# Patient Record
Sex: FEMALE | Race: WHITE | URBAN - METROPOLITAN AREA
[De-identification: names, ages, dates, MRNs, and addresses within clinical notes are randomized per-mention and may not be internally consistent; named-entity substitution may affect disease eponyms.]

---

## 2017-01-25 PROBLEM — J44.9 COPD (CHRONIC OBSTRUCTIVE PULMONARY DISEASE) (HCC): Status: ACTIVE | Noted: 2017-01-25

## 2017-01-25 RX ORDER — AMLODIPINE BESYLATE 10 MG/1
TABLET ORAL
COMMUNITY
Start: 2007-03-05 | End: 2017-01-27

## 2017-01-25 RX ORDER — BUDESONIDE AND FORMOTEROL FUMARATE DIHYDRATE 160; 4.5 UG/1; UG/1
2 AEROSOL RESPIRATORY (INHALATION) DAILY
COMMUNITY
Start: 2016-08-29 | End: 2017-11-27

## 2017-01-25 RX ORDER — ASPIRIN 81 MG/1
81 TABLET ORAL DAILY
COMMUNITY
Start: 2008-03-05

## 2017-01-25 RX ORDER — BENAZEPRIL HYDROCHLORIDE 40 MG/1
TABLET, FILM COATED ORAL
COMMUNITY
Start: 2007-03-05 | End: 2017-02-01

## 2017-01-25 RX ORDER — NIACIN 250 MG
TABLET, EXTENDED RELEASE ORAL
COMMUNITY
Start: 2013-03-06 | End: 2017-02-01 | Stop reason: SINTOL

## 2017-01-25 RX ORDER — BENAZEPRIL HYDROCHLORIDE 40 MG/1
TABLET, FILM COATED ORAL
Qty: 90 TABLET | Refills: 0 | Status: SHIPPED | OUTPATIENT
Start: 2017-01-25 | End: 2017-05-17

## 2017-01-25 RX ORDER — TRAMADOL HYDROCHLORIDE 50 MG/1
50 TABLET ORAL AS NEEDED
COMMUNITY
Start: 2014-02-10 | End: 2017-05-02

## 2017-01-25 RX ORDER — ALBUTEROL SULFATE 90 UG/1
1 AEROSOL, METERED RESPIRATORY (INHALATION) AS NEEDED
COMMUNITY
Start: 2016-09-13 | End: 2018-01-30

## 2017-01-25 RX ORDER — HYDROCODONE BITARTRATE AND ACETAMINOPHEN 10; 325 MG/1; MG/1
TABLET ORAL
COMMUNITY
Start: 2015-11-23 | End: 2017-02-09

## 2017-01-25 RX ORDER — PYRIDOXINE HCL (VITAMIN B6) 100 MG
TABLET ORAL
COMMUNITY
Start: 2016-12-08 | End: 2017-02-01

## 2017-01-25 RX ORDER — HYDROCHLOROTHIAZIDE 25 MG/1
25 TABLET ORAL DAILY
COMMUNITY
Start: 2007-03-05 | End: 2017-05-17

## 2017-01-27 ENCOUNTER — TELEPHONE (OUTPATIENT)
Dept: FAMILY MEDICINE CLINIC | Facility: CLINIC | Age: 72
End: 2017-01-27

## 2017-01-27 RX ORDER — AMLODIPINE BESYLATE 10 MG/1
10 TABLET ORAL DAILY
Qty: 90 TABLET | Refills: 0 | Status: SHIPPED | OUTPATIENT
Start: 2017-01-27 | End: 2017-05-17

## 2017-01-27 NOTE — TELEPHONE ENCOUNTER
Last Visit 11/22/2016. Has upcoming visit 2/1/17 with Dr Macrina Tamez.   Delisa Cartagena, 01/27/2017, 9:37 AM

## 2017-01-31 ENCOUNTER — MED REC SCAN ONLY (OUTPATIENT)
Dept: FAMILY MEDICINE CLINIC | Facility: CLINIC | Age: 72
End: 2017-01-31

## 2017-02-01 ENCOUNTER — OFFICE VISIT (OUTPATIENT)
Dept: FAMILY MEDICINE CLINIC | Facility: CLINIC | Age: 72
End: 2017-02-01

## 2017-02-01 VITALS
BODY MASS INDEX: 25.03 KG/M2 | HEART RATE: 100 BPM | RESPIRATION RATE: 12 BRPM | TEMPERATURE: 99 F | SYSTOLIC BLOOD PRESSURE: 122 MMHG | HEIGHT: 62 IN | DIASTOLIC BLOOD PRESSURE: 74 MMHG | WEIGHT: 136 LBS

## 2017-02-01 DIAGNOSIS — I10 ESSENTIAL HYPERTENSION: ICD-10-CM

## 2017-02-01 DIAGNOSIS — F17.200 TOBACCO USE DISORDER: ICD-10-CM

## 2017-02-01 DIAGNOSIS — M16.12 PRIMARY OSTEOARTHRITIS OF LEFT HIP: ICD-10-CM

## 2017-02-01 DIAGNOSIS — R11.0 NAUSEA IN ADULT: ICD-10-CM

## 2017-02-01 DIAGNOSIS — I69.959: ICD-10-CM

## 2017-02-01 DIAGNOSIS — S32.9XXS LATE EFFECT OF PELVIC FRACTURE: Primary | ICD-10-CM

## 2017-02-01 PROCEDURE — 99214 OFFICE O/P EST MOD 30 MIN: CPT | Performed by: FAMILY MEDICINE

## 2017-02-01 RX ORDER — ONDANSETRON 4 MG/1
4 TABLET, FILM COATED ORAL EVERY 8 HOURS PRN
Qty: 30 TABLET | Refills: 2 | Status: SHIPPED | OUTPATIENT
Start: 2017-02-01 | End: 2017-06-09

## 2017-02-01 RX ORDER — GABAPENTIN 100 MG/1
100 CAPSULE ORAL NIGHTLY
Qty: 30 CAPSULE | Refills: 5 | Status: SHIPPED | OUTPATIENT
Start: 2017-02-01 | End: 2017-08-29

## 2017-02-01 NOTE — PROGRESS NOTES
[address]  PROGRESS NOTE  Chief Complaint:   Patient presents with: Follow - Up  Nausea: Medication Related? HPI:   This is a 66year old female coming in for follow-up of her pain issues as well as persistent nausea.   She said th tablet (4 mg total) by mouth every 8 (eight) hours as needed for Nausea. Disp: 30 tablet Rfl: 2   AmLODIPine Besylate (NORVASC) 10 MG Oral Tab Take 1 tablet (10 mg total) by mouth daily.  Disp: 90 tablet Rfl: 0   BENAZEPRIL HCL 40 MG Oral Tab TAKE 1 TABLET enlarged nodes or history of splenectomy. PSYCHIATRIC: She is very frustrated by the nausea and said that she has been crying more frequently recently. ENDOCRINOLOGIC:  Denies excessive sweating, cold or heat intolerance, polyuria or polydipsia.   Chloé Parada of left hip  She has significant arthritis in her left hip that continues to cause her pain. 3. Hemiplegia as late effect of cerebrovascular disease ([address]  Her hemiplegia is unchanged.     4. Tobacco use disorder  She continues to smoke 3-6 cigarettes a [facility])     Hemiplegia as late effect of cerebrovascular disease ([facility]     Depression (emotion)     Pain in joint, pelvic region and thigh     Familial multiple lipoprotein-type hyperlipidemia     Essential hypertension     Late effect of pelvic fracture

## 2017-02-01 NOTE — PATIENT INSTRUCTIONS
Take Zofran every 8 hours as needed for nausea. Try Ginger tea - make it with one teaspoon of grated ginger in hot water - take it 2-4 times/day. Take Neurontin 100 mg at bedtime to help with pain.

## 2017-02-09 ENCOUNTER — TELEPHONE (OUTPATIENT)
Dept: FAMILY MEDICINE CLINIC | Facility: CLINIC | Age: 72
End: 2017-02-09

## 2017-02-09 NOTE — TELEPHONE ENCOUNTER
Dr. Venkatesh Hinds can not do her hip replacement. Says is too high risk. Needs to have done at a [facility]. Would like refill on norco and a rx for a laxative.

## 2017-02-10 ENCOUNTER — TELEPHONE (OUTPATIENT)
Dept: FAMILY MEDICINE CLINIC | Facility: CLINIC | Age: 72
End: 2017-02-10

## 2017-02-10 RX ORDER — HYDROCODONE BITARTRATE AND ACETAMINOPHEN 10; 325 MG/1; MG/1
1 TABLET ORAL EVERY 4 HOURS PRN
Qty: 120 TABLET | Refills: 0 | Status: SHIPPED | OUTPATIENT
Start: 2017-02-10 | End: 2017-03-06

## 2017-02-10 RX ORDER — SENNOSIDES 8.6 MG
8.6 TABLET ORAL 2 TIMES DAILY
Qty: 180 TABLET | Refills: 3 | Status: SHIPPED | OUTPATIENT
Start: 2017-02-10 | End: 2020-07-29 | Stop reason: ALTCHOICE

## 2017-02-10 NOTE — TELEPHONE ENCOUNTER
----- Message from Saul Solis sent at 2/10/2017  2:31 PM CST -----  Contact: PATIENT   WANTS TO MAKE SURE RX SHE REQUESTED FOR NORCO AND LAXATIVE IS UP AT THE   BY 4 PM

## 2017-02-10 NOTE — TELEPHONE ENCOUNTER
Patient's phone kept hanging up. No M/L. Copy of Senna instructions left with prescription.   Cheyenne Manning, 02/10/2017, 9:58 AM

## 2017-03-06 ENCOUNTER — TELEPHONE (OUTPATIENT)
Dept: FAMILY MEDICINE CLINIC | Facility: CLINIC | Age: 72
End: 2017-03-06

## 2017-03-06 RX ORDER — HYDROCODONE BITARTRATE AND ACETAMINOPHEN 10; 325 MG/1; MG/1
1 TABLET ORAL EVERY 4 HOURS PRN
Qty: 120 TABLET | Refills: 0 | Status: SHIPPED | OUTPATIENT
Start: 2017-03-06 | End: 2017-04-12

## 2017-03-06 NOTE — TELEPHONE ENCOUNTER
Patient states She is Scheduled for Hip Replacement with   Dr Dileep Gallagher on May 10th. Has PreOp Appt with Dr Nena Siu May 2nd. Please advise Piedad Laguerre.   Karsten Estevez, 03/06/2017, 9:36 AM

## 2017-03-22 ENCOUNTER — MED REC SCAN ONLY (OUTPATIENT)
Dept: FAMILY MEDICINE CLINIC | Facility: CLINIC | Age: 72
End: 2017-03-22

## 2017-03-28 ENCOUNTER — TELEPHONE (OUTPATIENT)
Dept: FAMILY MEDICINE CLINIC | Facility: CLINIC | Age: 72
End: 2017-03-28

## 2017-03-28 NOTE — TELEPHONE ENCOUNTER
Patient states She will be having PreOp Testing Here. States [facility] did not schedule diagnostic testing.   Deborah West, 03/28/2017, 2:45 PM

## 2017-03-28 NOTE — TELEPHONE ENCOUNTER
Patient states She will be having Her PreOp Diagnostic Testing done at [address] [place] [address]  Prior to Her Appt with Dr Cristie Cranker. Fax number given to Patient to have EKG Faxed to Dr Cristie Cranker.   Zainab Hdz, 03/28/2017, 12:44 PM

## 2017-04-03 ENCOUNTER — TELEPHONE (OUTPATIENT)
Dept: FAMILY MEDICINE CLINIC | Facility: CLINIC | Age: 72
End: 2017-04-03

## 2017-04-03 NOTE — TELEPHONE ENCOUNTER
M/L for [facility] [employer] that Patient's PreOp Appt is not until Next Wed 4/12.   Mercy Mohr, 04/03/2017, 3:09 PM

## 2017-04-12 ENCOUNTER — HOSPITAL ENCOUNTER (OUTPATIENT)
Dept: GENERAL RADIOLOGY | Age: 72
Discharge: HOME | End: 2017-04-12
Attending: FAMILY MEDICINE
Payer: MEDICARE

## 2017-04-12 ENCOUNTER — OFFICE VISIT (OUTPATIENT)
Dept: FAMILY MEDICINE CLINIC | Facility: CLINIC | Age: 72
End: 2017-04-12

## 2017-04-12 ENCOUNTER — LAB ENCOUNTER (OUTPATIENT)
Dept: LAB | Age: 72
End: 2017-04-12
Attending: FAMILY MEDICINE
Payer: MEDICARE

## 2017-04-12 VITALS
RESPIRATION RATE: 16 BRPM | HEART RATE: 100 BPM | DIASTOLIC BLOOD PRESSURE: 78 MMHG | OXYGEN SATURATION: 99 % | BODY MASS INDEX: 26.13 KG/M2 | HEIGHT: 62 IN | TEMPERATURE: 99 F | WEIGHT: 142 LBS | SYSTOLIC BLOOD PRESSURE: 152 MMHG

## 2017-04-12 DIAGNOSIS — E78.49 FAMILIAL MULTIPLE LIPOPROTEIN-TYPE HYPERLIPIDEMIA: ICD-10-CM

## 2017-04-12 DIAGNOSIS — I69.959: ICD-10-CM

## 2017-04-12 DIAGNOSIS — J44.9 CHRONIC OBSTRUCTIVE PULMONARY DISEASE, UNSPECIFIED COPD TYPE (HCC): ICD-10-CM

## 2017-04-12 DIAGNOSIS — I10 HTN (HYPERTENSION), BENIGN: ICD-10-CM

## 2017-04-12 DIAGNOSIS — I10 ESSENTIAL HYPERTENSION: ICD-10-CM

## 2017-04-12 DIAGNOSIS — M16.12 PRIMARY OSTEOARTHRITIS OF LEFT HIP: ICD-10-CM

## 2017-04-12 DIAGNOSIS — Z01.818 PREOPERATIVE CLEARANCE: ICD-10-CM

## 2017-04-12 DIAGNOSIS — G89.29 CHRONIC BILATERAL LOW BACK PAIN WITHOUT SCIATICA: ICD-10-CM

## 2017-04-12 DIAGNOSIS — F17.200 TOBACCO USE DISORDER: ICD-10-CM

## 2017-04-12 DIAGNOSIS — Z01.818 PREOPERATIVE CLEARANCE: Primary | ICD-10-CM

## 2017-04-12 DIAGNOSIS — M54.50 CHRONIC BILATERAL LOW BACK PAIN WITHOUT SCIATICA: ICD-10-CM

## 2017-04-12 PROCEDURE — 99214 OFFICE O/P EST MOD 30 MIN: CPT | Performed by: FAMILY MEDICINE

## 2017-04-12 PROCEDURE — 71020 XR CHEST PA + LAT CHEST (CPT=71020): CPT

## 2017-04-12 PROCEDURE — 93000 ELECTROCARDIOGRAM COMPLETE: CPT | Performed by: FAMILY MEDICINE

## 2017-04-12 PROCEDURE — 80053 COMPREHEN METABOLIC PANEL: CPT

## 2017-04-12 PROCEDURE — 85025 COMPLETE CBC W/AUTO DIFF WBC: CPT

## 2017-04-12 RX ORDER — HYDROCODONE BITARTRATE AND ACETAMINOPHEN 10; 325 MG/1; MG/1
1 TABLET ORAL EVERY 4 HOURS PRN
Qty: 120 TABLET | Refills: 0 | Status: SHIPPED | OUTPATIENT
Start: 2017-04-12 | End: 2017-05-12

## 2017-04-12 NOTE — H&P
[facility] [place]  PRE-OP NOTE    HPI:   Jaimee Caldwell is a 66year old female with a hx of osteoarthritis of her hip, who presents for a pre-operative physical exam. Patient is to have left hip replacement, to by done by Dr. Gaudencio Murcia at [facility]. Brittanie Juels daily.   Disp:  Rfl:    TraMADol HCl 50 MG Oral Tab Take 50 mg by mouth as needed. Disp:  Rfl:    Senna 8.6 MG Oral Tab Take 1 tablet (8.6 mg total) by mouth 2 (two) times daily.  Disp: 180 tablet Rfl: 3     Past Medical History   Diagnosis Date   • CVA ( nausea, vomiting, constipation, diarrhea, or blood in stool. MUSCULOSKELETAL: She has excruciating pain in her left hip. She is able to walk only short distances due to pain.   NEUROLOGICAL:  Denies headache, seizures, dizziness, syncope, paralysis, ataxi to her back. EXTREMITIES: She has severe pain in her left hip. She has limited range of motion in the left hip. It is tender to touch the external left hip. NEURO: She is chronic contracture of her right arm due to a previous stroke.   She has mild weak

## 2017-04-14 ENCOUNTER — TELEPHONE (OUTPATIENT)
Dept: FAMILY MEDICINE CLINIC | Facility: CLINIC | Age: 72
End: 2017-04-14

## 2017-04-14 NOTE — TELEPHONE ENCOUNTER
Patient here on 4/12/17 for pre-op physical  Okay to fax notes, EKG, and labs?     Palak Adan, 04/14/2017, 2:25 PM

## 2017-04-17 ENCOUNTER — TELEPHONE (OUTPATIENT)
Dept: FAMILY MEDICINE CLINIC | Facility: CLINIC | Age: 72
End: 2017-04-17

## 2017-04-18 ENCOUNTER — TELEPHONE (OUTPATIENT)
Dept: FAMILY MEDICINE CLINIC | Facility: CLINIC | Age: 72
End: 2017-04-18

## 2017-04-18 NOTE — TELEPHONE ENCOUNTER
----- Message from María Garg MD sent at 4/18/2017 10:33 AM CDT -----  Labs are normal.  Shana is medically clear for surgery.

## 2017-04-19 ENCOUNTER — MED REC SCAN ONLY (OUTPATIENT)
Dept: FAMILY MEDICINE CLINIC | Facility: CLINIC | Age: 72
End: 2017-04-19

## 2017-05-03 RX ORDER — TRAMADOL HYDROCHLORIDE 50 MG/1
TABLET ORAL
Qty: 240 TABLET | Refills: 1 | Status: SHIPPED
Start: 2017-05-03 | End: 2017-07-03

## 2017-05-12 ENCOUNTER — TELEPHONE (OUTPATIENT)
Dept: FAMILY MEDICINE CLINIC | Facility: CLINIC | Age: 72
End: 2017-05-12

## 2017-05-12 RX ORDER — HYDROCODONE BITARTRATE AND ACETAMINOPHEN 10; 325 MG/1; MG/1
1 TABLET ORAL EVERY 4 HOURS PRN
Qty: 120 TABLET | Refills: 0 | Status: SHIPPED | OUTPATIENT
Start: 2017-05-12 | End: 2017-06-08

## 2017-05-12 NOTE — TELEPHONE ENCOUNTER
Patient states Senna has not helped Constipation. Taking Colace also. Please advise new Rx. Also requesting Norco Refill.   Edgar Young, 05/12/2017, 9:16 AM

## 2017-05-17 ENCOUNTER — OFFICE VISIT (OUTPATIENT)
Dept: FAMILY MEDICINE CLINIC | Facility: CLINIC | Age: 72
End: 2017-05-17

## 2017-05-17 VITALS
HEIGHT: 62 IN | RESPIRATION RATE: 16 BRPM | DIASTOLIC BLOOD PRESSURE: 85 MMHG | BODY MASS INDEX: 25.95 KG/M2 | TEMPERATURE: 98 F | SYSTOLIC BLOOD PRESSURE: 142 MMHG | HEART RATE: 86 BPM | WEIGHT: 141 LBS

## 2017-05-17 DIAGNOSIS — M16.12 PRIMARY OSTEOARTHRITIS OF LEFT HIP: ICD-10-CM

## 2017-05-17 DIAGNOSIS — F33.41 RECURRENT MAJOR DEPRESSIVE DISORDER, IN PARTIAL REMISSION (HCC): ICD-10-CM

## 2017-05-17 DIAGNOSIS — I10 ESSENTIAL HYPERTENSION: Primary | ICD-10-CM

## 2017-05-17 DIAGNOSIS — J44.9 CHRONIC OBSTRUCTIVE PULMONARY DISEASE, UNSPECIFIED COPD TYPE (HCC): ICD-10-CM

## 2017-05-17 PROBLEM — Z01.818 PREOPERATIVE EXAMINATION: Status: RESOLVED | Noted: 2017-04-12 | Resolved: 2017-05-17

## 2017-05-17 PROBLEM — R11.0 NAUSEA IN ADULT: Status: RESOLVED | Noted: 2017-02-01 | Resolved: 2017-05-17

## 2017-05-17 PROCEDURE — 99214 OFFICE O/P EST MOD 30 MIN: CPT | Performed by: FAMILY MEDICINE

## 2017-05-17 RX ORDER — HYDROCHLOROTHIAZIDE 25 MG/1
25 TABLET ORAL DAILY
Qty: 90 TABLET | Refills: 3 | Status: SHIPPED | OUTPATIENT
Start: 2017-05-17 | End: 2018-07-10

## 2017-05-17 RX ORDER — BENAZEPRIL HYDROCHLORIDE 40 MG/1
40 TABLET, FILM COATED ORAL
Qty: 90 TABLET | Refills: 3 | Status: SHIPPED | OUTPATIENT
Start: 2017-05-17 | End: 2019-03-19 | Stop reason: ALTCHOICE

## 2017-05-17 RX ORDER — AMLODIPINE BESYLATE 10 MG/1
10 TABLET ORAL DAILY
Qty: 90 TABLET | Refills: 3 | Status: SHIPPED | OUTPATIENT
Start: 2017-05-17 | End: 2018-08-14

## 2017-05-17 NOTE — PROGRESS NOTES
[address]  PROGRESS NOTE  Chief Complaint:   Patient presents with: Follow - Up: hip replacement, renew blood pressure medication      HPI:   This is a 66year old female coming in for follow-up of her left hip replacement.   She said t uL   Lymphocyte Absolute 3.11 0.90-4.00 x10(3) uL   Monocyte Absolute 0.74 (H) 0.10-0.60 x10(3) uL   Eosinophil Absolute 0.24 0.00-0.30 x10(3) uL   Basophil Absolute 0.07 0.00-0.10 x10(3) uL   Immature Granulocyte Absolute 0.04 0.00-1.00 x10(3) uL   Neutro hours as needed for Pain (Limit 4 daily).  Disp: 120 tablet Rfl: 0   TRAMADOL HCL 50 MG Oral Tab TAKE 2 TABLETS BY MOUTH EVERY 4 HOURS AS NEEDED, MAX 8 TABLETS A DAY Disp: 240 tablet Rfl: 1   gabapentin 100 MG Oral Cap Take 1 capsule (100 mg total) by mouth cough or sputum. GASTROINTESTINAL:  Denies abdominal pain, nausea, vomiting, constipation, diarrhea, or blood in stool. MUSCULOSKELETAL: Her left hip arthritis pain is much better.   The discomfort that she has now is postoperative pain and is gradually i rales/rhonchi/wheezing. ABDOMEN:  Soft, nondistended, nontender, bowel sounds normal in all 4 quadrants, no masses, no hepatosplenomegaly. BACK: No tenderness, no spasm, SLR test negative, FROM. EXTREMITIES: Left hip: Surgical wound is healing well.   No pulmonary disease) ([facility]     Hemiplegia as late effect of cerebrovascular disease (HCC)     Depression (emotion)     Pain in joint, pelvic region and thigh     Familial multiple lipoprotein-type hyperlipidemia     Essential hypertension     Late effect of

## 2017-06-08 ENCOUNTER — TELEPHONE (OUTPATIENT)
Dept: FAMILY MEDICINE CLINIC | Facility: CLINIC | Age: 72
End: 2017-06-08

## 2017-06-08 NOTE — TELEPHONE ENCOUNTER
Patient is going to be moving from one daughters house to the next. Patient states that she had a hip replacement and has been taking norco and that really helps with the pain.  Patient states that she has enough for the weekend but would really like a refi

## 2017-06-09 RX ORDER — ONDANSETRON 4 MG/1
4 TABLET, FILM COATED ORAL EVERY 8 HOURS PRN
Qty: 30 TABLET | Refills: 2 | Status: SHIPPED | OUTPATIENT
Start: 2017-06-09 | End: 2019-03-19

## 2017-06-09 RX ORDER — HYDROCODONE BITARTRATE AND ACETAMINOPHEN 10; 325 MG/1; MG/1
1 TABLET ORAL EVERY 4 HOURS PRN
Qty: 120 TABLET | Refills: 0 | Status: SHIPPED | OUTPATIENT
Start: 2017-06-09 | End: 2017-07-03

## 2017-06-09 NOTE — TELEPHONE ENCOUNTER
Patient also requesting refill Zofran. Patient states She has been having increased bouts of crying. Patient is in the process of moving to Her Daughter Jaclyn's house. Requesting to restart Sertraline.      Had problem in past with fatigue on Sertraline

## 2017-06-12 NOTE — PROGRESS NOTES
[facility] [place]  PROGRESS NOTE  Chief Complaint:   Patient presents with:  Depression  Hand Pain: Thumb  Leg Pain      HPI:   This is a 66year old female coming in for multiple concerns today. She has been feeling more depressed recently. Absolute 0.74 (H) 0.10-0.60 x10(3) uL   Eosinophil Absolute 0.24 0.00-0.30 x10(3) uL   Basophil Absolute 0.07 0.00-0.10 x10(3) uL   Immature Granulocyte Absolute 0.04 0.00-1.00 x10(3) uL   Neutrophil % 58.5 %   Lymphocyte % 30.7 %   Monocyte % 7.3 %   Eosi mouth daily. Disp: 90 tablet Rfl: 3   Benazepril HCl 40 MG Oral Tab Take 1 tablet (40 mg total) by mouth once daily. Disp: 90 tablet Rfl: 3   AmLODIPine Besylate (NORVASC) 10 MG Oral Tab Take 1 tablet (10 mg total) by mouth daily.  Disp: 90 tablet Rfl: 3 syncope, paralysis, ataxia, numbness or tingling in the extremities,change in bowel or bladder control. HEMATOLOGIC:  Denies anemia, bleeding or bruising. LYMPHATICS:  Denies enlarged nodes or history of splenectomy.   PSYCHIATRIC: She is feeling more and varicose veins bilaterally. She has barely palpable dorsalis pedal pulses on both feet. Color of both feet is normal.  NEURO:  No deficit, normal gait, strength and tone, sensory intact. ASSESSMENT AND PLAN:   1.  Moderate episode of recurrent major de fracture     Pain in soft tissues of limb     Low back pain     Tobacco use disorder     Osteoarthrosis     Peripheral vascular disease ([address]     Sciatica     Arthropathy of pelvis     Fracture of superior pubic ramus (HCC)     H/O laminectomy     Osteoart

## 2017-06-29 ENCOUNTER — TELEPHONE (OUTPATIENT)
Dept: FAMILY MEDICINE CLINIC | Facility: CLINIC | Age: 72
End: 2017-06-29

## 2017-06-30 NOTE — TELEPHONE ENCOUNTER
As above. Spoke with Jamshid Briscoe. Patient's home phone is not working. Appt moved Monday from the afternoon to Monday Morning 7/3 9:15 with   Dr Vannesa Gee.   Santosh Ray, 06/30/17, 10:41 AM

## 2017-07-03 NOTE — PROGRESS NOTES
[address]  PROGRESS NOTE  Chief Complaint:   Patient presents with: Anxiety: Daughter Recent Dx Cancer. Medication refill      HPI:   This is a 66year old female coming in for check on her Lexapro.   She said that since she started norman Immature Granulocyte Absolute 0.04 0.00 - 1.00 x10(3) uL   Neutrophil % 58.5 %   Lymphocyte % 30.7 %   Monocyte % 7.3 %   Eosinophil % 2.4 %   Basophil % 0.7 %   Immature Granulocyte % 0.4 %       Past Medical History:   Diagnosis Date   • Acute, but ill hydrochlorothiazide 25 MG Oral Tab Take 1 tablet (25 mg total) by mouth daily. Disp: 90 tablet Rfl: 3   Benazepril HCl 40 MG Oral Tab Take 1 tablet (40 mg total) by mouth once daily.  Disp: 90 tablet Rfl: 3   AmLODIPine Besylate (NORVASC) 10 MG Oral Tab T dizziness, syncope, paralysis, ataxia, numbness or tingling in the extremities,change in bowel or bladder control. HEMATOLOGIC:  Denies anemia, bleeding or bruising. LYMPHATICS:  Denies enlarged nodes or history of splenectomy.   PSYCHIATRIC: Her depressi fatigue. Plan: Increase the Lexapro to 10 mg daily. Hopefully that will help with some of the significant fatigue that she has been feeling. 2. Essential hypertension  She has hypertension. Her blood pressure control has been good.     3. Tobacco use use disorder     Osteoarthrosis     Peripheral vascular disease (HCC)     Sciatica     Arthropathy of pelvis     Fracture of superior pubic ramus (HCC)     H/O laminectomy     Osteoarthritis of hip      Db Oglesby MD  7/3/2017  12:59 PM

## 2017-08-01 ENCOUNTER — TELEPHONE (OUTPATIENT)
Dept: FAMILY MEDICINE CLINIC | Facility: CLINIC | Age: 72
End: 2017-08-01

## 2017-08-01 RX ORDER — ONDANSETRON 4 MG/1
TABLET, FILM COATED ORAL
Qty: 30 TABLET | Refills: 1 | Status: SHIPPED | OUTPATIENT
Start: 2017-08-01 | End: 2017-09-05

## 2017-08-01 RX ORDER — HYDROCODONE BITARTRATE AND ACETAMINOPHEN 10; 325 MG/1; MG/1
1 TABLET ORAL EVERY 4 HOURS PRN
Qty: 180 TABLET | Refills: 0 | Status: SHIPPED | OUTPATIENT
Start: 2017-08-01 | End: 2017-08-28

## 2017-08-01 NOTE — TELEPHONE ENCOUNTER
Patient informed Rx ready to . Solitario Mejiaerer, 08/01/17, 12:34 PM    Patient states She has been taking 2-3 naps a day. States with Lexapro 5mg it was not helping with Her Depression. Feels the 10mg is causing Her drowsiness.     Also feels She is

## 2017-08-28 ENCOUNTER — TELEPHONE (OUTPATIENT)
Dept: FAMILY MEDICINE CLINIC | Facility: CLINIC | Age: 72
End: 2017-08-28

## 2017-08-28 RX ORDER — HYDROCODONE BITARTRATE AND ACETAMINOPHEN 10; 325 MG/1; MG/1
1 TABLET ORAL EVERY 4 HOURS PRN
Qty: 180 TABLET | Refills: 0 | Status: SHIPPED | OUTPATIENT
Start: 2017-08-28 | End: 2017-09-25

## 2017-08-28 NOTE — TELEPHONE ENCOUNTER
LOV: 7/3/17  Last Refill:8/1/17  Last Labs:4/12/17    Future Appointments  Date Time Provider Aroldo Lopez   11/17/2017 11:00 AM Rony Ken MD [facility] [place] [facility]

## 2017-08-29 RX ORDER — GABAPENTIN 100 MG/1
CAPSULE ORAL
Qty: 30 CAPSULE | Refills: 11 | Status: SHIPPED | OUTPATIENT
Start: 2017-08-29 | End: 2017-10-06

## 2017-08-29 NOTE — TELEPHONE ENCOUNTER
Last ov: 7/3/17  Last RF: 2/1/17  Last labs: 4/12/17  Your appointments     Date & Time Appointment Department [facility])    Nov 17, 2017 11:00 AM CST Follow up - Extended with Merly Stroud MD [address] [place] [address]

## 2017-09-05 ENCOUNTER — OFFICE VISIT (OUTPATIENT)
Dept: FAMILY MEDICINE CLINIC | Facility: CLINIC | Age: 72
End: 2017-09-05

## 2017-09-05 ENCOUNTER — TELEPHONE (OUTPATIENT)
Dept: FAMILY MEDICINE CLINIC | Facility: CLINIC | Age: 72
End: 2017-09-05

## 2017-09-05 VITALS
RESPIRATION RATE: 20 BRPM | TEMPERATURE: 99 F | SYSTOLIC BLOOD PRESSURE: 108 MMHG | DIASTOLIC BLOOD PRESSURE: 80 MMHG | WEIGHT: 143.81 LBS | HEIGHT: 62 IN | BODY MASS INDEX: 26.46 KG/M2 | HEART RATE: 84 BPM

## 2017-09-05 DIAGNOSIS — H92.21 BLEEDING FROM RIGHT EAR: Primary | ICD-10-CM

## 2017-09-05 PROCEDURE — 99213 OFFICE O/P EST LOW 20 MIN: CPT | Performed by: NURSE PRACTITIONER

## 2017-09-05 RX ORDER — NEOMYCIN SULFATE, POLYMYXIN B SULFATE, HYDROCORTISONE 3.5; 10000; 1 MG/ML; [USP'U]/ML; MG/ML
4 SOLUTION/ DROPS AURICULAR (OTIC) 4 TIMES DAILY
Qty: 10 ML | Refills: 0 | Status: SHIPPED | OUTPATIENT
Start: 2017-09-05 | End: 2017-10-06 | Stop reason: ALTCHOICE

## 2017-09-05 NOTE — PATIENT INSTRUCTIONS
Use drops as directed. Okay to apply cotton to the area for the bleeding. Contact office if bleeding does not stop in the next 24-48 hours. Otherwise follow-up with Dr. Coral Lord as previously directed.

## 2017-09-05 NOTE — TELEPHONE ENCOUNTER
Patient Today woke up with bleeding in right ear. States not having any pain, but bleeding persisting. Has cotton ball in ear. Appt given today 1:30 with Kristel CARTWRIGHT for evalution of Sx.   Guzman Perez, 09/05/17, 9:56 AM

## 2017-09-05 NOTE — PROGRESS NOTES
HPI:    Patient ID: Rubi Sierra is a 66year old female. HPI     Woke up with bleeding to the right ear canal this ear. Is tired after hosting a wedding over the weekend at her house. Left ear has been itching. Tired, but otherwise feeling ok. mg total) by mouth daily. Disp: 90 tablet Rfl: 3   Albuterol Sulfate HFA (PROAIR HFA) 108 (90 BASE) MCG/ACT Inhalation Aero Soln Inhale 1 puff into the lungs as needed. Disp:  Rfl:    aspirin 81 MG Oral Tab EC Take 81 mg by mouth daily.    Disp:  Rfl: kg/m².         ASSESSMENT/PLAN:   Bleeding from right ear  (primary encounter diagnosis)    No orders of the defined types were placed in this encounter.       Meds This Visit:  Signed Prescriptions Disp Refills    Neomycin-Polymyxin-HC (CORTISPORIN) 1 % Ot

## 2017-09-11 RX ORDER — TRAMADOL HYDROCHLORIDE 50 MG/1
TABLET ORAL
Qty: 240 TABLET | Refills: 1 | Status: SHIPPED
Start: 2017-09-11 | End: 2017-11-21

## 2017-09-11 NOTE — TELEPHONE ENCOUNTER
Last office visit 7/3/17  Last BW 4/12/17    F/u 3 months    Future Appointments  Date Time Provider Aroldo Lopez   11/17/2017 11:00 AM Rony Ken MD [facility] [place] [facility]

## 2017-09-13 ENCOUNTER — TELEPHONE (OUTPATIENT)
Dept: FAMILY MEDICINE CLINIC | Facility: CLINIC | Age: 72
End: 2017-09-13

## 2017-09-13 RX ORDER — ESCITALOPRAM OXALATE 5 MG/1
5 TABLET ORAL DAILY
Qty: 90 TABLET | Refills: 3 | Status: SHIPPED | OUTPATIENT
Start: 2017-09-13 | End: 2017-10-06

## 2017-09-13 NOTE — TELEPHONE ENCOUNTER
Patient states Lexapro 10mg is causing to much fatigue. Requesting to change back to 5mg Lexapro. Requesting new Rx to Pharmacy. Please advise.   Delisa Cartagena, 09/13/17, 9:54 AM

## 2017-09-25 ENCOUNTER — TELEPHONE (OUTPATIENT)
Dept: FAMILY MEDICINE CLINIC | Facility: CLINIC | Age: 72
End: 2017-09-25

## 2017-09-25 RX ORDER — HYDROCODONE BITARTRATE AND ACETAMINOPHEN 10; 325 MG/1; MG/1
1 TABLET ORAL EVERY 4 HOURS PRN
Qty: 180 TABLET | Refills: 0 | Status: SHIPPED | OUTPATIENT
Start: 2017-09-25 | End: 2017-10-06

## 2017-09-25 NOTE — TELEPHONE ENCOUNTER
Future appt:     Your appointments     Date & Time Appointment Department [facility])    Nov 17, 2017 11:00 AM CST Follow up - Extended with Trevin Montana MD [address] [place] [address] ([facility])        [address] [place] [address]

## 2017-10-04 ENCOUNTER — TELEPHONE (OUTPATIENT)
Dept: FAMILY MEDICINE CLINIC | Facility: CLINIC | Age: 72
End: 2017-10-04

## 2017-10-04 RX ORDER — CYCLOBENZAPRINE HCL 10 MG
10 TABLET ORAL 3 TIMES DAILY
Qty: 30 TABLET | Refills: 1 | Status: SHIPPED
Start: 2017-10-04 | End: 2017-10-24

## 2017-10-04 NOTE — TELEPHONE ENCOUNTER
Patient is requesting Muscle Relaxant. States Her Left Buttock is very hard and painful. Please advise.   Neel Hernandez, 10/04/17, 10:27 AM

## 2017-10-06 ENCOUNTER — OFFICE VISIT (OUTPATIENT)
Dept: FAMILY MEDICINE CLINIC | Facility: CLINIC | Age: 72
End: 2017-10-06

## 2017-10-06 VITALS
HEIGHT: 62 IN | TEMPERATURE: 99 F | BODY MASS INDEX: 25.98 KG/M2 | SYSTOLIC BLOOD PRESSURE: 132 MMHG | HEART RATE: 108 BPM | DIASTOLIC BLOOD PRESSURE: 64 MMHG | WEIGHT: 141.19 LBS | RESPIRATION RATE: 16 BRPM

## 2017-10-06 DIAGNOSIS — M54.50 CHRONIC BILATERAL LOW BACK PAIN WITHOUT SCIATICA: Primary | ICD-10-CM

## 2017-10-06 DIAGNOSIS — I10 ESSENTIAL HYPERTENSION: ICD-10-CM

## 2017-10-06 DIAGNOSIS — M16.10: ICD-10-CM

## 2017-10-06 DIAGNOSIS — M15.9 PRIMARY OSTEOARTHRITIS INVOLVING MULTIPLE JOINTS: ICD-10-CM

## 2017-10-06 DIAGNOSIS — S32.519S: ICD-10-CM

## 2017-10-06 DIAGNOSIS — G89.29 CHRONIC BILATERAL LOW BACK PAIN WITHOUT SCIATICA: Primary | ICD-10-CM

## 2017-10-06 DIAGNOSIS — F33.1 MODERATE EPISODE OF RECURRENT MAJOR DEPRESSIVE DISORDER (HCC): ICD-10-CM

## 2017-10-06 DIAGNOSIS — I73.9 PERIPHERAL VASCULAR DISEASE (HCC): ICD-10-CM

## 2017-10-06 DIAGNOSIS — J44.9 CHRONIC OBSTRUCTIVE PULMONARY DISEASE, UNSPECIFIED COPD TYPE (HCC): ICD-10-CM

## 2017-10-06 PROCEDURE — 99214 OFFICE O/P EST MOD 30 MIN: CPT | Performed by: FAMILY MEDICINE

## 2017-10-06 RX ORDER — HYDROCODONE BITARTRATE AND ACETAMINOPHEN 10; 325 MG/1; MG/1
TABLET ORAL
Qty: 240 TABLET | Refills: 0 | Status: SHIPPED | OUTPATIENT
Start: 2017-10-06 | End: 2017-11-10

## 2017-10-06 RX ORDER — HYDROCODONE BITARTRATE AND ACETAMINOPHEN 10; 325 MG/1; MG/1
TABLET ORAL
Qty: 240 TABLET | Refills: 0 | Status: SHIPPED | OUTPATIENT
Start: 2017-10-06 | End: 2017-10-06

## 2017-10-06 NOTE — PROGRESS NOTES
[address]  PROGRESS NOTE  Chief Complaint:   Patient presents with: Follow - Up: Medications and Pain/ Right Buttock  Hives: From Gabapentin? HPI:   This is a 66year old female coming in for follow-up on her pain.   She is very [employer] Absolute 3.11 0.90 - 4.00 x10(3) uL   Monocyte Absolute 0.74 (H) 0.10 - 0.60 x10(3) uL   Eosinophil Absolute 0.24 0.00 - 0.30 x10(3) uL   Basophil Absolute 0.07 0.00 - 0.10 x10(3) uL   Immature Granulocyte Absolute 0.04 0.00 - 1.00 x10(3) uL   Neutrophil % Rfl: 1   Ondansetron HCl (ZOFRAN) 4 mg tablet Take 1 tablet (4 mg total) by mouth every 8 (eight) hours as needed for Nausea. Disp: 30 tablet Rfl: 2   hydrochlorothiazide 25 MG Oral Tab Take 1 tablet (25 mg total) by mouth daily.  Disp: 90 tablet Rfl: 3   B Denies anemia, bleeding or bruising. LYMPHATICS:  Denies enlarged nodes or history of splenectomy. PSYCHIATRIC: She denies feeling depressed and yet was very sad.   ENDOCRINOLOGIC:  Denies excessive sweating, cold or heat intolerance, polyuria or polydips perspective the only thing that has been helpful for her as Norco.    2. Closed fracture of superior pubic ramus, unspecified laterality, sequela  She has a healing closed fracture of the superior pubic ramus.     3. Chronic obstructive pulmonary disease, u Depression (emotion)     Pain in joint, pelvic region and thigh     Familial multiple lipoprotein-type hyperlipidemia     Essential hypertension     Late effect of pelvic fracture     Pain in soft tissues of limb     Low back pain     Tobacco use disorder

## 2017-10-06 NOTE — PATIENT INSTRUCTIONS
Stop taking Gabapentin and Lexapro. Norco increased to 8/day. Call with progress on chest rash - should heal over the next 2 weeks.

## 2017-10-16 ENCOUNTER — TELEPHONE (OUTPATIENT)
Dept: FAMILY MEDICINE CLINIC | Facility: CLINIC | Age: 72
End: 2017-10-16

## 2017-10-16 NOTE — TELEPHONE ENCOUNTER
Patient informed Insurance will not cover the Norco increase. Informed Rx for Elizabeth Looney will revert back to original dose/agreed. Christal informed of above also. They will change Rx received to #180 tabs and use original directions.   Jodi Batista, 10/

## 2017-10-30 ENCOUNTER — TELEPHONE (OUTPATIENT)
Dept: FAMILY MEDICINE CLINIC | Facility: CLINIC | Age: 72
End: 2017-10-30

## 2017-10-30 NOTE — TELEPHONE ENCOUNTER
Rash on chest is still there, also on back, and base of neck, used everything.   Please give her a call

## 2017-10-31 ENCOUNTER — OFFICE VISIT (OUTPATIENT)
Dept: FAMILY MEDICINE CLINIC | Facility: CLINIC | Age: 72
End: 2017-10-31

## 2017-10-31 VITALS
HEIGHT: 61.5 IN | DIASTOLIC BLOOD PRESSURE: 76 MMHG | WEIGHT: 140.5 LBS | HEART RATE: 98 BPM | BODY MASS INDEX: 26.19 KG/M2 | SYSTOLIC BLOOD PRESSURE: 128 MMHG | RESPIRATION RATE: 16 BRPM | TEMPERATURE: 99 F

## 2017-10-31 DIAGNOSIS — R21 RASH: Primary | ICD-10-CM

## 2017-10-31 PROCEDURE — 99213 OFFICE O/P EST LOW 20 MIN: CPT | Performed by: NURSE PRACTITIONER

## 2017-10-31 NOTE — PATIENT INSTRUCTIONS
Use steroid cream to area twice a day for 2 weeks. Do not lot this medication get on your face or be covered with vaseline. If no improvement, consider dermatology referral.  Resume gabapentin. Recommend changing detergent.   Monitor for signs of infecti

## 2017-10-31 NOTE — PROGRESS NOTES
[address] [place] [address]  PROGRESS NOTE  Andressa Saucedo is a 67year old female. Chief Complaint:  Patient presents with:  Rash: rash for about 6 weeks, moving to back    HPI:   Patient presents to office visit with complaint of rash x 6 weeks. HIP REPLACEMENT SURGERY Left  No date: HYSTERECTOMY  No date: TUBAL LIGATION  Social History:  Smoking status: Current Every Day Smoker                                                   Packs/day: 0.50      Years: 50.00        Start date: 1/1/1967  Shereen (SYMBICORT) 160-4.5 MCG/ACT Inhalation Aerosol Inhale 2 puffs into the lungs daily. Disp:  Rfl:    Senna 8.6 MG Oral Tab Take 1 tablet (8.6 mg total) by mouth 2 (two) times daily.  Disp: 180 tablet Rfl: 3      Ready to quit: Not Answered  Counseling given benefits, side effects of medication addressed and explained. Patient Instructions   Use steroid cream to area twice a day for 2 weeks. Do not lot this medication get on your face or be covered with vaseline.    If no improvement, consider dermatology re

## 2017-11-09 ENCOUNTER — TELEPHONE (OUTPATIENT)
Dept: FAMILY MEDICINE CLINIC | Facility: CLINIC | Age: 72
End: 2017-11-09

## 2017-11-09 NOTE — TELEPHONE ENCOUNTER
Patient states she has been feeling very well as long as she keeps up with taking the [address].  States she usually takes 2 tabs at 7:00am, 2 tabs around 1:00pm, and when she wakes up in the night with pain, she will take 1-2 tabs.     Patient states the other

## 2017-11-09 NOTE — TELEPHONE ENCOUNTER
OTW for Friday:  needs norco refill- requesting the quantity of 240- said son is going to pay for the script so that she doesn't have a hard time with Medicare

## 2017-11-10 RX ORDER — HYDROCODONE BITARTRATE AND ACETAMINOPHEN 10; 325 MG/1; MG/1
TABLET ORAL
Qty: 180 TABLET | Refills: 0 | Status: SHIPPED | OUTPATIENT
Start: 2017-11-10 | End: 2017-12-06

## 2017-11-10 NOTE — TELEPHONE ENCOUNTER
The goal for the Lachoen Rides is to gradually be decreasing the dose not increasing it. At this point I will not write for an increased dose. I will give her a prescription for 180 tablets. However I will not write a prescription for 240 tablets.

## 2017-11-21 RX ORDER — ONDANSETRON 4 MG/1
TABLET, FILM COATED ORAL
Qty: 30 TABLET | Refills: 0 | Status: SHIPPED | OUTPATIENT
Start: 2017-11-21 | End: 2017-12-26

## 2017-11-21 RX ORDER — TRAMADOL HYDROCHLORIDE 50 MG/1
TABLET ORAL
Qty: 240 TABLET | Refills: 0 | Status: SHIPPED
Start: 2017-11-21 | End: 2017-12-26

## 2017-11-21 RX ORDER — DULOXETIN HYDROCHLORIDE 30 MG/1
CAPSULE, DELAYED RELEASE ORAL
Qty: 30 CAPSULE | Refills: 0 | Status: SHIPPED | OUTPATIENT
Start: 2017-11-21 | End: 2018-07-17 | Stop reason: ALTCHOICE

## 2017-11-21 NOTE — TELEPHONE ENCOUNTER
Future appt:  None   Last Appointment:  10/6/2017; Return in about 3 months (around 1/6/2018).      No results found for: CHOLEST, HDL, LDL, TRIGLY, TRIG  No results found for: EAG, A1C  No results found for: T4F, TSH, TSHT4    Last Labs: 4/12/2017     No F

## 2017-11-27 RX ORDER — BUDESONIDE AND FORMOTEROL FUMARATE DIHYDRATE 160; 4.5 UG/1; UG/1
2 AEROSOL RESPIRATORY (INHALATION) DAILY
Qty: 1 INHALER | Refills: 11 | Status: SHIPPED | OUTPATIENT
Start: 2017-11-27 | End: 2017-11-28

## 2017-11-27 NOTE — TELEPHONE ENCOUNTER
refill needed on her symbicort, got a fill on duloxetine, stated she doesn't take this, diamond leach, please advise

## 2017-11-27 NOTE — TELEPHONE ENCOUNTER
Requesting refill Symbicort to [facility]. States Her legs have been very painful. Questions if there is a problem with Her stents? Advised to call Surgeon who placed the stents and go in for consultation/agreed.   Malvin Garner, 11/27/17, 3:

## 2017-11-28 ENCOUNTER — TELEPHONE (OUTPATIENT)
Dept: FAMILY MEDICINE CLINIC | Facility: CLINIC | Age: 72
End: 2017-11-28

## 2017-11-28 RX ORDER — BUDESONIDE AND FORMOTEROL FUMARATE DIHYDRATE 160; 4.5 UG/1; UG/1
2 AEROSOL RESPIRATORY (INHALATION) 2 TIMES DAILY
Qty: 1 INHALER | Refills: 11 | Status: SHIPPED | OUTPATIENT
Start: 2017-11-28 | End: 2018-12-18

## 2017-12-06 RX ORDER — HYDROCODONE BITARTRATE AND ACETAMINOPHEN 10; 325 MG/1; MG/1
TABLET ORAL
Qty: 180 TABLET | Refills: 0 | Status: SHIPPED | OUTPATIENT
Start: 2017-12-06 | End: 2018-01-08

## 2017-12-14 ENCOUNTER — OFFICE VISIT (OUTPATIENT)
Dept: FAMILY MEDICINE CLINIC | Facility: CLINIC | Age: 72
End: 2017-12-14

## 2017-12-14 VITALS
SYSTOLIC BLOOD PRESSURE: 130 MMHG | RESPIRATION RATE: 18 BRPM | TEMPERATURE: 99 F | WEIGHT: 137.38 LBS | DIASTOLIC BLOOD PRESSURE: 72 MMHG | HEART RATE: 96 BPM | HEIGHT: 61.5 IN | BODY MASS INDEX: 25.6 KG/M2

## 2017-12-14 DIAGNOSIS — L02.91 ABSCESS: Primary | ICD-10-CM

## 2017-12-14 DIAGNOSIS — H53.8 BLURRY VISION, BILATERAL: ICD-10-CM

## 2017-12-14 DIAGNOSIS — R35.0 URINARY FREQUENCY: ICD-10-CM

## 2017-12-14 DIAGNOSIS — R32 URINARY INCONTINENCE, UNSPECIFIED TYPE: ICD-10-CM

## 2017-12-14 PROCEDURE — 87086 URINE CULTURE/COLONY COUNT: CPT | Performed by: NURSE PRACTITIONER

## 2017-12-14 PROCEDURE — 87205 SMEAR GRAM STAIN: CPT | Performed by: NURSE PRACTITIONER

## 2017-12-14 PROCEDURE — 87070 CULTURE OTHR SPECIMN AEROBIC: CPT | Performed by: NURSE PRACTITIONER

## 2017-12-14 PROCEDURE — 81003 URINALYSIS AUTO W/O SCOPE: CPT | Performed by: NURSE PRACTITIONER

## 2017-12-14 PROCEDURE — 87077 CULTURE AEROBIC IDENTIFY: CPT | Performed by: NURSE PRACTITIONER

## 2017-12-14 PROCEDURE — 99214 OFFICE O/P EST MOD 30 MIN: CPT | Performed by: NURSE PRACTITIONER

## 2017-12-14 RX ORDER — SULFAMETHOXAZOLE AND TRIMETHOPRIM 800; 160 MG/1; MG/1
1 TABLET ORAL 2 TIMES DAILY
Qty: 20 TABLET | Refills: 0 | Status: SHIPPED | OUTPATIENT
Start: 2017-12-14 | End: 2018-01-30 | Stop reason: ALTCHOICE

## 2017-12-14 NOTE — PROGRESS NOTES
[address] [place] [address]  PROGRESS NOTE  Estefani Panchal is a 67year old female.     Chief Complaint:  Patient presents with:  Blurred Vision  Lesion: sores on legs  Fatigue: and body aches  Leaking: bladder leaking frequent  Loss Of Appetite: eats 4 date: CHOLECYSTECTOMY  4/17/2017: HIP REPLACEMENT SURGERY Left  No date: HYSTERECTOMY  No date: TUBAL LIGATION  Social History:  Smoking status: Current Every Day Smoker                                                   Packs/day: 0.50      Years: 50.00 40 MG Oral Tab Take 1 tablet (40 mg total) by mouth once daily. Disp: 90 tablet Rfl: 3   AmLODIPine Besylate (NORVASC) 10 MG Oral Tab Take 1 tablet (10 mg total) by mouth daily.  Disp: 90 tablet Rfl: 3   Albuterol Sulfate HFA (PROAIR HFA) 108 (90 BASE) MCG/ EMOI. TMs pearly, no retraction, no bulging, no fluid, landmarks present, posterior pharynx pink, no exudates. Nares patent, nasal mucosa pink and nonedematous.   NECK: supple, no cervical lymphadenopathy  LUNGS: clear to auscultation, no wheezing, rhonchi time  Return in 2 days for follow up. Call if fever, chills, spreading redness, vomiting. Due to physical with PCP, return after the holidays for this. Blurry vision: Trial refresh eyedrops over-the-counter as needed.   Recommend patient calling oph

## 2017-12-14 NOTE — PROCEDURES
I&D performed: Cleaned area with Betadine ×3 then used 1 cc lidocaine with epinephrine to numb area, used #11 scalpel for vertical laceration, able to get some discharge from site and culture collected. Bacitracin applied with Band-Aid.   Patient gave vika

## 2017-12-14 NOTE — PATIENT INSTRUCTIONS
Push fluids, rest.  Antibiotic as prescribed, take with food. Call your opthalmology   Urine culture and wound culture collected, will take 3 days for results  Warm compresses to wound on leg 3x a day for 10 min each time  Return in 2 days for follow up.

## 2017-12-16 ENCOUNTER — TELEPHONE (OUTPATIENT)
Dept: FAMILY MEDICINE CLINIC | Facility: CLINIC | Age: 72
End: 2017-12-16

## 2017-12-16 NOTE — TELEPHONE ENCOUNTER
As above. Patient had to cancel today's appt. R/S to Tuesday 12/19. Wanting Kathy to know Her vision has cleared up 70%. Asking if She is to keep Her leg wound covered or open to air? Please advise.   Mercy Mohr, 12/16/17, 8:55 AM

## 2017-12-16 NOTE — TELEPHONE ENCOUNTER
Noted, is patient feeling better? No fever, chills, vomiting, spreading redness from site? If there is any drainage from site, recommend keeping covered with Band-Aid. Continue with plan of care as discussed in last office visit.   Glad vision has improv

## 2017-12-16 NOTE — TELEPHONE ENCOUNTER
Patient states she if feeling much better. Notified of Kathy's recommendation, expressed understanding and thanks.

## 2017-12-16 NOTE — TELEPHONE ENCOUNTER
Pt had to cancel appointmentt this morning due to daughter being sick and not being able to drive her. Pt states that she would like to be called back.

## 2017-12-18 ENCOUNTER — APPOINTMENT (OUTPATIENT)
Dept: LAB | Age: 72
End: 2017-12-18
Attending: FAMILY MEDICINE
Payer: MEDICARE

## 2017-12-18 ENCOUNTER — OFFICE VISIT (OUTPATIENT)
Dept: FAMILY MEDICINE CLINIC | Facility: CLINIC | Age: 72
End: 2017-12-18

## 2017-12-18 VITALS
BODY MASS INDEX: 26 KG/M2 | TEMPERATURE: 99 F | HEART RATE: 85 BPM | OXYGEN SATURATION: 95 % | SYSTOLIC BLOOD PRESSURE: 132 MMHG | WEIGHT: 139.63 LBS | DIASTOLIC BLOOD PRESSURE: 64 MMHG | RESPIRATION RATE: 18 BRPM

## 2017-12-18 DIAGNOSIS — Z09 FOLLOW UP: ICD-10-CM

## 2017-12-18 DIAGNOSIS — H53.8 BLURRY VISION: Primary | ICD-10-CM

## 2017-12-18 DIAGNOSIS — G47.9 SLEEP TROUBLE: ICD-10-CM

## 2017-12-18 DIAGNOSIS — L02.91 ABSCESS: ICD-10-CM

## 2017-12-18 PROCEDURE — 80053 COMPREHEN METABOLIC PANEL: CPT | Performed by: NURSE PRACTITIONER

## 2017-12-18 PROCEDURE — 85025 COMPLETE CBC W/AUTO DIFF WBC: CPT | Performed by: NURSE PRACTITIONER

## 2017-12-18 PROCEDURE — 36415 COLL VENOUS BLD VENIPUNCTURE: CPT | Performed by: NURSE PRACTITIONER

## 2017-12-18 PROCEDURE — 84443 ASSAY THYROID STIM HORMONE: CPT | Performed by: NURSE PRACTITIONER

## 2017-12-18 PROCEDURE — 99214 OFFICE O/P EST MOD 30 MIN: CPT | Performed by: NURSE PRACTITIONER

## 2017-12-18 NOTE — PROGRESS NOTES
[address]  PROGRESS NOTE  Giovanna Gaston is a 67year old female. Chief Complaint:  Patient presents with:   Other: loss of appetite, fatigue, blurred [id number] got worse recheck leg is feeling better    HPI:   Patient presents CHOLECYSTECTOMY  4/17/2017: HIP REPLACEMENT SURGERY Left  No date: HYSTERECTOMY  No date: TUBAL LIGATION  Social History:  Smoking status: Current Every Day Smoker                                                   Packs/day: 0.50      Years: 50.00        S MG Oral Tab Take 1 tablet (25 mg total) by mouth daily. Disp: 90 tablet Rfl: 3   Benazepril HCl 40 MG Oral Tab Take 1 tablet (40 mg total) by mouth once daily.  Disp: 90 tablet Rfl: 3   AmLODIPine Besylate (NORVASC) 10 MG Oral Tab Take 1 tablet (10 mg total rhonchi, crackles. Breathing is nonlabored.   CARDIO: RRR, S1 and S2 heard, without murmur  GI: good BS's,no masses, no HSM, no tenderness, no G/R  EXTREMITIES: no cyanosis, clubbing or edema  Psych: Alert and orientated ×3, appropriate affect      ASSESSM

## 2017-12-18 NOTE — PATIENT INSTRUCTIONS
Continue antibiotic as prescribed. Abscess site looking good, continue with warm soaks 3 x a day  Call ophthalmology, Dr. Trujillo Marking today. Go to ER if eye pain or vision worsens  Blood work today.   Do physical with PCP in 1-2 weeks or return sooner if sympt

## 2017-12-19 ENCOUNTER — TELEPHONE (OUTPATIENT)
Dept: FAMILY MEDICINE CLINIC | Facility: CLINIC | Age: 72
End: 2017-12-19

## 2017-12-19 NOTE — TELEPHONE ENCOUNTER
----- Message from SHARON Delgadillo sent at 12/19/2017  7:24 AM CST -----  TSH normal  CMP essentially normal  CBC essentially normal  Please call pt to ensure she has called her ophthalmologist in [place], Dr. Lieutenant Kulkarni.  Pt was to have called him for blurry

## 2017-12-19 NOTE — TELEPHONE ENCOUNTER
Patient notified of results and recommendations. States she will call ophthalmologist today today to make appt.

## 2017-12-22 ENCOUNTER — TELEPHONE (OUTPATIENT)
Dept: FAMILY MEDICINE CLINIC | Facility: CLINIC | Age: 72
End: 2017-12-22

## 2017-12-22 DIAGNOSIS — I73.9 PERIPHERAL VASCULAR DISEASE (HCC): Primary | ICD-10-CM

## 2017-12-22 NOTE — TELEPHONE ENCOUNTER
needs IR order consult  / for radiation to [facility]     for leg blood flow issues      okay to address next week  OTW

## 2017-12-22 NOTE — TELEPHONE ENCOUNTER
Patient denies any swelling, redness or edema, no chest pain, or SOB. States if she has any of these symptoms she will go to ER.

## 2017-12-22 NOTE — TELEPHONE ENCOUNTER
Please ensure patient is not having any type of leg edema, redness, pain that could be related to DVT. Ensure patient not having any chest pain, shortness of breath as well. If asymptomatic, okay to wait for PCP for order.

## 2017-12-26 RX ORDER — TRAMADOL HYDROCHLORIDE 50 MG/1
TABLET ORAL
Qty: 240 TABLET | Refills: 1 | Status: SHIPPED
Start: 2017-12-26 | End: 2018-02-20

## 2017-12-26 RX ORDER — ONDANSETRON 4 MG/1
TABLET, FILM COATED ORAL
Qty: 30 TABLET | Refills: 3 | Status: SHIPPED | OUTPATIENT
Start: 2017-12-26 | End: 2018-01-30

## 2017-12-26 NOTE — TELEPHONE ENCOUNTER
Patient in bed with the flu. Daughter informed Dr Zilphia Krabbe advising Referal to Dr Radha Carrillo regarding Leg Pain/agreed. Phone number given-She will call for Appt.   Sharmaine Garcia, 12/26/17, 2:55 PM

## 2017-12-26 NOTE — TELEPHONE ENCOUNTER
Future appt:  None   Last Appointment:  10/6/2017 (Dr. Abhi Moy); Return in about 3 months (around 1/6/2018).      No results found for: CHOLEST, HDL, LDL, TRIGLY, TRIG  No results found for: EAG, A1C    Lab Results  Component Value Date   TSH 1.660 12/18/2

## 2017-12-30 ENCOUNTER — TELEPHONE (OUTPATIENT)
Dept: FAMILY MEDICINE CLINIC | Facility: CLINIC | Age: 72
End: 2017-12-30

## 2017-12-30 NOTE — TELEPHONE ENCOUNTER
Patient states She feels bad. Wants antibiotic. Short of breath/cough. Asked to speak with daughter. Family is in [place]. Spoke with JAY. Patient has been laying in bed x2 days. Walking to bathroom only and this is exhausted.   Parveen Olmstead

## 2018-01-05 ENCOUNTER — TELEPHONE (OUTPATIENT)
Dept: FAMILY MEDICINE CLINIC | Facility: CLINIC | Age: 73
End: 2018-01-05

## 2018-01-05 RX ORDER — ALPRAZOLAM 0.5 MG/1
0.5 TABLET ORAL 3 TIMES DAILY PRN
Qty: 30 TABLET | Refills: 1 | Status: SHIPPED
Start: 2018-01-05 | End: 2018-02-28

## 2018-01-05 NOTE — TELEPHONE ENCOUNTER
Pt was hospitalized for approximately one wk at [facility].  Daughter states pt has COPD, but also dx: with COPD, pt returned home last night,  Pt currently on oxygen.   Daughter states that pt was given Xanax while at [place], and asking for RX at th

## 2018-01-05 NOTE — TELEPHONE ENCOUNTER
was seen in [facility] and was prescibed Xanax    would like RF  to [facility] in [place]     ( has appt on 1/12 )         please advise

## 2018-01-08 ENCOUNTER — TELEPHONE (OUTPATIENT)
Dept: FAMILY MEDICINE CLINIC | Facility: CLINIC | Age: 73
End: 2018-01-08

## 2018-01-08 RX ORDER — HYDROCODONE BITARTRATE AND ACETAMINOPHEN 10; 325 MG/1; MG/1
TABLET ORAL
Qty: 180 TABLET | Refills: 0 | Status: SHIPPED | OUTPATIENT
Start: 2018-01-08 | End: 2018-01-08

## 2018-01-08 RX ORDER — HYDROCODONE BITARTRATE AND ACETAMINOPHEN 10; 325 MG/1; MG/1
TABLET ORAL
Qty: 180 TABLET | Refills: 0 | Status: SHIPPED | OUTPATIENT
Start: 2018-01-08 | End: 2018-06-19 | Stop reason: ALTCHOICE

## 2018-01-08 NOTE — TELEPHONE ENCOUNTER
Future appt:     Your appointments     Date & Time Appointment Department [facility])    Jan 12, 2018 11:30 AM CST Exam - Established Patient with Eros Mclean MD [facility] [place] ([facility]        Ariela

## 2018-01-16 ENCOUNTER — TELEPHONE (OUTPATIENT)
Dept: FAMILY MEDICINE CLINIC | Facility: CLINIC | Age: 73
End: 2018-01-16

## 2018-01-16 NOTE — TELEPHONE ENCOUNTER
I tried to reach [facility] to let her know that she no showed for her MD appointment today. Mailbox/ voicemail not set up to leave a message.

## 2018-01-30 ENCOUNTER — OFFICE VISIT (OUTPATIENT)
Dept: FAMILY MEDICINE CLINIC | Facility: CLINIC | Age: 73
End: 2018-01-30

## 2018-01-30 VITALS
RESPIRATION RATE: 18 BRPM | OXYGEN SATURATION: 97 % | HEIGHT: 61.5 IN | DIASTOLIC BLOOD PRESSURE: 80 MMHG | BODY MASS INDEX: 24.44 KG/M2 | HEART RATE: 102 BPM | TEMPERATURE: 98 F | WEIGHT: 131.13 LBS | SYSTOLIC BLOOD PRESSURE: 140 MMHG

## 2018-01-30 DIAGNOSIS — J11.1 INFLUENZA: Primary | ICD-10-CM

## 2018-01-30 DIAGNOSIS — J44.9 CHRONIC OBSTRUCTIVE PULMONARY DISEASE, UNSPECIFIED COPD TYPE (HCC): ICD-10-CM

## 2018-01-30 DIAGNOSIS — M54.50 CHRONIC BILATERAL LOW BACK PAIN WITHOUT SCIATICA: ICD-10-CM

## 2018-01-30 DIAGNOSIS — G89.29 CHRONIC BILATERAL LOW BACK PAIN WITHOUT SCIATICA: ICD-10-CM

## 2018-01-30 DIAGNOSIS — G89.4 CHRONIC PAIN SYNDROME: ICD-10-CM

## 2018-01-30 PROCEDURE — 99214 OFFICE O/P EST MOD 30 MIN: CPT | Performed by: FAMILY MEDICINE

## 2018-01-30 RX ORDER — PREGABALIN 75 MG/1
75 CAPSULE ORAL DAILY
Qty: 30 CAPSULE | Refills: 5 | Status: SHIPPED | OUTPATIENT
Start: 2018-01-30 | End: 2018-02-03

## 2018-01-30 RX ORDER — HYDROCODONE BITARTRATE AND ACETAMINOPHEN 10; 325 MG/1; MG/1
TABLET ORAL
Qty: 180 TABLET | Refills: 0 | Status: CANCELLED | OUTPATIENT
Start: 2018-01-30

## 2018-01-30 RX ORDER — ALBUTEROL SULFATE 90 UG/1
2 AEROSOL, METERED RESPIRATORY (INHALATION) EVERY 4 HOURS PRN
Qty: 3 INHALER | Refills: 3 | Status: SHIPPED | OUTPATIENT
Start: 2018-01-30 | End: 2019-05-14

## 2018-01-30 NOTE — PROGRESS NOTES
[facility] [place]  PROGRESS NOTE  Chief Complaint:   Patient presents with:  Hospital F/U      HPI:   This is a 67year old female coming in for hospital follow-up.   She was hospitalized at [facility] from January 6 through the 13th due t 6.70 x10 (3) uL   Neutrophil Absolute 5.55 1.30 - 6.70 x10(3) uL   Lymphocyte Absolute 2.85 0.90 - 4.00 x10(3) uL   Monocyte Absolute 0.56 0.10 - 0.60 x10(3) uL   Eosinophil Absolute 0.24 0.00 - 0.30 x10(3) uL   Basophil Absolute 0.04 0.00 - 0.10 x10(3) uL HYDROcodone-acetaminophen (NORCO)  MG Oral Tab Take 1-2 tablets every 4 hours as needed for pain. Limit 6/day. Disp: 180 tablet Rfl: 0   ALPRAZolam 0.5 MG Oral Tab Take 1 tablet (0.5 mg total) by mouth 3 (three) times daily as needed for Sleep.  Aubree Coburn throat. INTEGUMENTARY:  Denies rashes, itching, skin lesion, or excessive skin dryness. CARDIOVASCULAR:  Denies chest pain, chest pressure, chest discomfort, palpitations, edema, dyspnea on exertion or at rest.  RESPIRATORY: She has home oxygen.   She darinel normal. Nose: patent, no nasal discharge Throat:  No tonsillar erythema or exudate. Mouth:  No oral lesions or ulcerations, good dentition. NECK: Supple, no CLAD, no JVD, no thyromegaly. SKIN: No rashes, no skin lesion, no bruising, good turgor.   HEART: Aero Soln 3 Inhaler 3      Sig: Inhale 2 puffs into the lungs every 4 (four) hours as needed. pregabalin (LYRICA) 75 MG Oral Cap 30 capsule 5      Sig: Take 1 capsule (75 mg total) by mouth daily.              Patient/Caregiver Education: Patient/Careg

## 2018-01-31 ENCOUNTER — MED REC SCAN ONLY (OUTPATIENT)
Dept: FAMILY MEDICINE CLINIC | Facility: CLINIC | Age: 73
End: 2018-01-31

## 2018-02-03 ENCOUNTER — TELEPHONE (OUTPATIENT)
Dept: FAMILY MEDICINE CLINIC | Facility: CLINIC | Age: 73
End: 2018-02-03

## 2018-02-03 RX ORDER — PREGABALIN 75 MG/1
75 CAPSULE ORAL 2 TIMES DAILY
Qty: 60 CAPSULE | Refills: 5 | Status: SHIPPED
Start: 2018-02-03 | End: 2018-02-13

## 2018-02-03 NOTE — TELEPHONE ENCOUNTER
Patient informed of below. Expressed understanding. Needing updated Rx to Pharmacy. Please advise.   Jodi Batista, 02/03/18, 10:01 AM

## 2018-02-03 NOTE — TELEPHONE ENCOUNTER
wants to let dr Janae Martines know that Nik Benson is giving her about 4 hours of comfort about an hour after she takes medication- wants to know if he should increase dose?

## 2018-02-13 ENCOUNTER — TELEPHONE (OUTPATIENT)
Dept: FAMILY MEDICINE CLINIC | Facility: CLINIC | Age: 73
End: 2018-02-13

## 2018-02-13 RX ORDER — PREGABALIN 75 MG/1
75 CAPSULE ORAL 3 TIMES DAILY
COMMUNITY
Start: 2018-02-13 | End: 2018-02-20

## 2018-02-16 ENCOUNTER — TELEPHONE (OUTPATIENT)
Dept: FAMILY MEDICINE CLINIC | Facility: CLINIC | Age: 73
End: 2018-02-16

## 2018-02-16 NOTE — TELEPHONE ENCOUNTER
Patient states She is going to cut back to Lyrica 1x daily until She sees Dr Paula Morgan on Tuesday. States since She increased to 3daily She has become Angry/Aggitated/Blurry Vision/Leg Weakness.     Patient will take Xanax 1/2 tab in the morning/afternoon t

## 2018-02-16 NOTE — TELEPHONE ENCOUNTER
Noted, Pt to go to ER if  sob, cp, numbness or tingling of arms, slurred speech, headache, mental or vision changes.

## 2018-02-16 NOTE — TELEPHONE ENCOUNTER
Daughter Luis Angel Speak informed of below. Expressed understanding.   Aimee Lugo, 02/16/18, 1:48 PM

## 2018-02-20 ENCOUNTER — HOSPITAL ENCOUNTER (OUTPATIENT)
Dept: GENERAL RADIOLOGY | Age: 73
Discharge: HOME | End: 2018-02-20
Attending: FAMILY MEDICINE
Payer: MEDICARE

## 2018-02-20 ENCOUNTER — OFFICE VISIT (OUTPATIENT)
Dept: FAMILY MEDICINE CLINIC | Facility: CLINIC | Age: 73
End: 2018-02-20

## 2018-02-20 VITALS
HEART RATE: 88 BPM | HEIGHT: 61.5 IN | DIASTOLIC BLOOD PRESSURE: 62 MMHG | OXYGEN SATURATION: 92 % | TEMPERATURE: 98 F | BODY MASS INDEX: 25.25 KG/M2 | WEIGHT: 135.5 LBS | SYSTOLIC BLOOD PRESSURE: 120 MMHG | RESPIRATION RATE: 18 BRPM

## 2018-02-20 DIAGNOSIS — F17.200 TOBACCO USE DISORDER: ICD-10-CM

## 2018-02-20 DIAGNOSIS — M15.9 PRIMARY OSTEOARTHRITIS INVOLVING MULTIPLE JOINTS: ICD-10-CM

## 2018-02-20 DIAGNOSIS — J44.9 CHRONIC OBSTRUCTIVE PULMONARY DISEASE, UNSPECIFIED COPD TYPE (HCC): ICD-10-CM

## 2018-02-20 DIAGNOSIS — M16.12 PRIMARY OSTEOARTHRITIS OF LEFT HIP: ICD-10-CM

## 2018-02-20 DIAGNOSIS — I10 ESSENTIAL HYPERTENSION: ICD-10-CM

## 2018-02-20 DIAGNOSIS — F33.1 MODERATE EPISODE OF RECURRENT MAJOR DEPRESSIVE DISORDER (HCC): ICD-10-CM

## 2018-02-20 DIAGNOSIS — M25.559 PAIN IN JOINT INVOLVING PELVIC REGION AND THIGH, UNSPECIFIED LATERALITY: ICD-10-CM

## 2018-02-20 DIAGNOSIS — Z98.890 H/O LAMINECTOMY: ICD-10-CM

## 2018-02-20 DIAGNOSIS — M25.559 PAIN IN JOINT INVOLVING PELVIC REGION AND THIGH, UNSPECIFIED LATERALITY: Primary | ICD-10-CM

## 2018-02-20 PROCEDURE — 99214 OFFICE O/P EST MOD 30 MIN: CPT | Performed by: FAMILY MEDICINE

## 2018-02-20 PROCEDURE — 73523 X-RAY EXAM HIPS BI 5/> VIEWS: CPT | Performed by: FAMILY MEDICINE

## 2018-02-20 RX ORDER — TRAMADOL HYDROCHLORIDE 50 MG/1
100 TABLET ORAL EVERY 6 HOURS PRN
Qty: 240 TABLET | Refills: 1 | Status: SHIPPED | OUTPATIENT
Start: 2018-02-20 | End: 2018-06-12

## 2018-02-21 NOTE — PROGRESS NOTES
[facility] [place]  PROGRESS NOTE  Chief Complaint:   Patient presents with:  Medication Follow-Up      HPI:   This is a 67year old female coming in for follow-up.     She said that the Lyrica was hugely beneficial for the first 10 days that she 5.1 mmol/L   Chloride 104 101 - 111 mmol/L   CO2 28.0 22.0 - 32.0 mmol/L   -CBC W/ DIFFERENTIAL   Result Value Ref Range   WBC 9.3 4.0 - 13.0 x10(3) uL   RBC 4.35 3.80 - 5.10 x10(6)uL   HGB 13.9 12.0 - 16.0 g/dL   HCT 42.0 34.0 - 50.0 %   .0 150.0 - effect  Lyrica [Pregabalin]     Restless  [place]                    Percocet  [Oxycodon*        Comment:Other reaction(s): Nausea  Current Meds:    Current Outpatient Prescriptions:  TraMADol HCl 50 MG Oral Tab Take 2 tablets (100 mg total) by vaishnavi CONSTITUTIONAL:  Denies unusual weight gain/loss, fever, chills, or fatigue. EENT:  Eyes:  Denies eye pain, visual loss, blurred vision, double vision or yellow sclerae.  Ears, Nose, Throat:  Denies hearing loss, sneezing, congestion, runny nose or sore CLAD, no JVD, no thyromegaly. SKIN: No rashes, no skin lesion, no bruising, good turgor. HEART:  Regular rate and rhythm, no murmurs, rubs or gallops. LUNGS: Clear to auscultation bilterally, no rales/rhonchi/wheezing.   ABDOMEN:  Soft, nondistended, non Tobacco use disorder  She has resumed her cigarette smoking. She had quit successfully for several weeks but is now restarted again.     8. Moderate episode of recurrent major depressive disorder ([facility] [address]  She does have anxiety and frustration associated with

## 2018-02-28 RX ORDER — ALPRAZOLAM 0.5 MG/1
0.5 TABLET ORAL 3 TIMES DAILY PRN
Qty: 30 TABLET | Refills: 1 | Status: SHIPPED
Start: 2018-02-28 | End: 2018-06-12

## 2018-03-07 ENCOUNTER — OFFICE VISIT (OUTPATIENT)
Dept: FAMILY MEDICINE CLINIC | Facility: CLINIC | Age: 73
End: 2018-03-07

## 2018-03-07 VITALS
BODY MASS INDEX: 24.94 KG/M2 | DIASTOLIC BLOOD PRESSURE: 78 MMHG | SYSTOLIC BLOOD PRESSURE: 144 MMHG | RESPIRATION RATE: 20 BRPM | HEART RATE: 74 BPM | HEIGHT: 61.5 IN | WEIGHT: 133.81 LBS | TEMPERATURE: 99 F

## 2018-03-07 DIAGNOSIS — L03.115 CELLULITIS OF RIGHT LOWER EXTREMITY: ICD-10-CM

## 2018-03-07 DIAGNOSIS — L98.9 SKIN LESION OF RIGHT LEG: Primary | ICD-10-CM

## 2018-03-07 PROCEDURE — 99214 OFFICE O/P EST MOD 30 MIN: CPT | Performed by: FAMILY MEDICINE

## 2018-03-07 RX ORDER — CEPHALEXIN 500 MG/1
500 CAPSULE ORAL 3 TIMES DAILY
Qty: 30 CAPSULE | Refills: 0 | Status: SHIPPED | OUTPATIENT
Start: 2018-03-07 | End: 2018-06-12 | Stop reason: ALTCHOICE

## 2018-03-07 NOTE — PROGRESS NOTES
[facility] [place]  PROGRESS NOTE  Chief Complaint:   Patient presents with:  Lesion: right leg      HPI:   This is a 67year old female presents complaining of skin lesion on the right leg that has been present for past 4-5 months there is slow 0.00 - 1.00 x10(3) uL   Neutrophil % 60.0 %   Lymphocyte % 30.7 %   Monocyte % 6.0 %   Eosinophil % 2.6 %   Basophil % 0.4 %   Immature Granulocyte % 0.3 %       Past Medical History:   Diagnosis Date   • Acute, but ill-defined, cerebrovascular disease lungs every 4 (four) hours as needed. Disp: 3 Inhaler Rfl: 3   HYDROcodone-acetaminophen (NORCO)  MG Oral Tab Take 1-2 tablets every 4 hours as needed for pain. Limit 6/day. Disp: 180 tablet Rfl: 0   Budesonide-Formoterol Fumarate (SYMBICORT) 160-4. depression or anxiety. EXAM:   /78 (BP Location: Left arm, Patient Position: Sitting, Cuff Size: adult)   Pulse 74   Temp 98.7 °F (37.1 °C) (Tympanic)   Resp 20   Ht 61.5\"   Wt 133 lb 12.8 oz   BMI 24.87 kg/m²  Estimated body mass index is 24. 8 Risk(1 of 2 - PCV13) due on 10/14/2009  Influenza Vaccine(1) due on 09/01/2017  Fall Risk Screening due on 05/17/2018    Patient/Caregiver Education: Patient/Caregiver Education: There are no barriers to learning. Medical education done.    Outcome: Patient

## 2018-03-07 NOTE — PATIENT INSTRUCTIONS
Start Cephalexin. Recommend topical neosporin daily. See Dermatologist asap  Return to clinic if any increase redness, pain, warmth, fever or oozing.

## 2018-03-13 ENCOUNTER — TELEPHONE (OUTPATIENT)
Dept: FAMILY MEDICINE CLINIC | Facility: CLINIC | Age: 73
End: 2018-03-13

## 2018-03-13 NOTE — TELEPHONE ENCOUNTER
Restarting Norco now would not be a good idea - it is too easy to become addicted to it again. This pain will ease rapidly as the wound heals. Continue Advil/Tramadol/Tylenol for pain relief.

## 2018-03-13 NOTE — TELEPHONE ENCOUNTER
Patient informed of below. Expressed understanding. States She will be finding a New Physician and requesting Her April PreOp Appt Cancelled.   Karsten Estevez, 03/13/18, 9:59 AM

## 2018-03-13 NOTE — TELEPHONE ENCOUNTER
Patient states She had a Basal Cell Carcinoma taken off Her leg Friday Night. Will be having further surgery to be sure margins are clear. Patient did see Dr Lavinia Alonso and advised Dermatologist.    States She has an open wound now and is very painful.   Stat

## 2018-04-04 ENCOUNTER — MED REC SCAN ONLY (OUTPATIENT)
Dept: FAMILY MEDICINE CLINIC | Facility: CLINIC | Age: 73
End: 2018-04-04

## 2018-04-14 ENCOUNTER — TELEPHONE (OUTPATIENT)
Dept: FAMILY MEDICINE CLINIC | Facility: CLINIC | Age: 73
End: 2018-04-14

## 2018-04-14 NOTE — TELEPHONE ENCOUNTER
Address: [address] [place] [address], [place], [facility] [facility]  Phone: [phone]

## 2018-04-14 NOTE — TELEPHONE ENCOUNTER
Dr. Shalini Medellin mentioned that his brother is a DrEtienne in [place].  wanted to get that phone number

## 2018-04-23 RX ORDER — BENAZEPRIL HYDROCHLORIDE 40 MG/1
TABLET, FILM COATED ORAL
Qty: 90 TABLET | Refills: 0 | Status: SHIPPED | OUTPATIENT
Start: 2018-04-23 | End: 2018-06-12

## 2018-04-23 NOTE — TELEPHONE ENCOUNTER
Dr. Hernandez is out of the office today. One refill done.  ALBERT Negrete, FNP-BC  4/23/2018  2:49 PM

## 2018-04-23 NOTE — TELEPHONE ENCOUNTER
Future appt:     Last Appointment:  2/20/2018; Return in about 3 months (around 5/20/2018).      No results found for: CHOLEST, HDL, LDL, TRIGLY, TRIG  No results found for: EAG, A1C    Lab Results  Component Value Date   TSH 1.660 12/18/2017     Last RF:

## 2018-04-24 RX ORDER — TRAMADOL HYDROCHLORIDE 50 MG/1
TABLET ORAL
Qty: 240 TABLET | Refills: 0 | OUTPATIENT
Start: 2018-04-24

## 2018-04-24 RX ORDER — ALPRAZOLAM 0.5 MG/1
TABLET ORAL
Qty: 30 TABLET | Refills: 0 | OUTPATIENT
Start: 2018-04-24

## 2018-05-29 RX ORDER — HYDROCHLOROTHIAZIDE 25 MG/1
TABLET ORAL
Qty: 90 TABLET | Refills: 0 | OUTPATIENT
Start: 2018-05-29

## 2018-06-11 ENCOUNTER — TELEPHONE (OUTPATIENT)
Dept: FAMILY MEDICINE CLINIC | Facility: CLINIC | Age: 73
End: 2018-06-11

## 2018-06-11 NOTE — TELEPHONE ENCOUNTER
Patient post hip surgery. Patient is in a lot of pain that Norco/Tramadol is not helping. Has been getting [place] from Ortho surgeon. Feels maybe Xanax would help with the pain. Requesting Appt.   Appt given Tues 6/12 with Dr Vannesa Gee 10:30 for evaluat

## 2018-06-11 NOTE — TELEPHONE ENCOUNTER
patient is feeling weak on her left side after recent hip surgery- would like appt for tomorrow but no avail open time slots- can you squeeze her in for tomorrow

## 2018-06-12 ENCOUNTER — OFFICE VISIT (OUTPATIENT)
Dept: FAMILY MEDICINE CLINIC | Facility: CLINIC | Age: 73
End: 2018-06-12

## 2018-06-12 VITALS
SYSTOLIC BLOOD PRESSURE: 112 MMHG | OXYGEN SATURATION: 97 % | DIASTOLIC BLOOD PRESSURE: 56 MMHG | BODY MASS INDEX: 24.96 KG/M2 | HEART RATE: 103 BPM | RESPIRATION RATE: 36 BRPM | WEIGHT: 135.63 LBS | HEIGHT: 62 IN | TEMPERATURE: 98 F

## 2018-06-12 DIAGNOSIS — E78.49 FAMILIAL MULTIPLE LIPOPROTEIN-TYPE HYPERLIPIDEMIA: ICD-10-CM

## 2018-06-12 DIAGNOSIS — I69.351 FLACCID HEMIPLEGIA OF RIGHT DOMINANT SIDE AS LATE EFFECT OF CEREBRAL INFARCTION (HCC): ICD-10-CM

## 2018-06-12 DIAGNOSIS — I10 ESSENTIAL HYPERTENSION: ICD-10-CM

## 2018-06-12 DIAGNOSIS — F17.200 TOBACCO USE DISORDER: ICD-10-CM

## 2018-06-12 DIAGNOSIS — G89.4 CHRONIC PAIN SYNDROME: ICD-10-CM

## 2018-06-12 DIAGNOSIS — M79.10 MYALGIA: Primary | ICD-10-CM

## 2018-06-12 DIAGNOSIS — M15.9 PRIMARY OSTEOARTHRITIS INVOLVING MULTIPLE JOINTS: ICD-10-CM

## 2018-06-12 DIAGNOSIS — G89.29 CHRONIC BILATERAL LOW BACK PAIN WITHOUT SCIATICA: ICD-10-CM

## 2018-06-12 DIAGNOSIS — M54.50 CHRONIC BILATERAL LOW BACK PAIN WITHOUT SCIATICA: ICD-10-CM

## 2018-06-12 PROCEDURE — 99214 OFFICE O/P EST MOD 30 MIN: CPT | Performed by: FAMILY MEDICINE

## 2018-06-12 PROCEDURE — 1111F DSCHRG MED/CURRENT MED MERGE: CPT | Performed by: FAMILY MEDICINE

## 2018-06-12 RX ORDER — POLYETHYLENE GLYCOL 3350 17 G/17G
17 POWDER, FOR SOLUTION ORAL DAILY
COMMUNITY
End: 2020-07-29 | Stop reason: ALTCHOICE

## 2018-06-12 RX ORDER — TRAMADOL HYDROCHLORIDE 50 MG/1
100 TABLET ORAL EVERY 6 HOURS PRN
Qty: 240 TABLET | Refills: 1 | Status: SHIPPED | OUTPATIENT
Start: 2018-06-12 | End: 2018-08-07

## 2018-06-12 RX ORDER — METHYLPREDNISOLONE 4 MG/1
TABLET ORAL
Qty: 1 KIT | Refills: 0 | Status: SHIPPED | OUTPATIENT
Start: 2018-06-12 | End: 2018-07-17 | Stop reason: ALTCHOICE

## 2018-06-12 RX ORDER — ALPRAZOLAM 0.5 MG/1
0.5 TABLET ORAL 3 TIMES DAILY PRN
Qty: 90 TABLET | Refills: 1 | Status: SHIPPED | OUTPATIENT
Start: 2018-06-12 | End: 2018-08-16

## 2018-06-12 NOTE — PROGRESS NOTES
[facility] [place]  PROGRESS NOTE  Chief Complaint:   Patient presents with:  Hospital F/U: Pain post op/Hip--Daughter Passed Away--Refill Tramadol/Xanax      HPI:   This is a 67year old female coming in for follow-up.   She had her right hip re 111 mmol/L   CO2 28.0 22.0 - 32.0 mmol/L   -CBC W/ DIFFERENTIAL   Result Value Ref Range   WBC 9.3 4.0 - 13.0 x10(3) uL   RBC 4.35 3.80 - 5.10 x10(6)uL   HGB 13.9 12.0 - 16.0 g/dL   HCT 42.0 34.0 - 50.0 %   .0 150.0 - 450.0 10(3)uL   MCV 96.6 81.0 - [Pregabalin]     RESTLESSNESS  [place]                    Percocet  [Oxycodon*        Comment:Other reaction(s): Nausea  Current Meds:    Current Outpatient Prescriptions:  PEG 3350 Oral Powd Pack Take 17 g by mouth daily.  Disp:  Rfl:    methylPREDNISolone Take 81 mg by mouth daily. Disp:  Rfl:       Ready to quit: Not Answered  Counseling given: Not Answered       REVIEW OF SYSTEMS:   CONSTITUTIONAL: See HPI  EENT:  Eyes:  Denies eye pain, visual loss, blurred vision, double vision or yellow sclerae.  Ears normal. Nose: patent, no nasal discharge Throat:  No tonsillar erythema or exudate. Mouth:  No oral lesions or ulcerations, good dentition. NECK: Supple, no CLAD, no JVD, no thyromegaly. SKIN: No rashes, no skin lesion, no bruising, good turgor.   HEART: in 2 weeks. If her pain is not improved then labs would be indicated then including CPK and sed rate.       Meds & Refills for this Visit:  Signed Prescriptions Disp Refills    methylPREDNISolone (MEDROL) 4 MG Oral Tablet Therapy Pack 1 kit 0      Sig: As

## 2018-06-16 ENCOUNTER — TELEPHONE (OUTPATIENT)
Dept: FAMILY MEDICINE CLINIC | Facility: CLINIC | Age: 73
End: 2018-06-16

## 2018-06-16 NOTE — TELEPHONE ENCOUNTER
Confirmed with Laura Singh that patient firmly does NOT want to be called today. Would like call on Monday.

## 2018-06-18 NOTE — TELEPHONE ENCOUNTER
Patient states She is very weak. Hurts all over. Pain medication not helping. States She had diarrhea x2 days that is now better. Advised ER evaluation/agrees. Asked that I call Her daughter Negro Castillo and let her know Patient should go to ER.     Negro Sandovalkarin will

## 2018-06-19 ENCOUNTER — TELEPHONE (OUTPATIENT)
Dept: FAMILY MEDICINE CLINIC | Facility: CLINIC | Age: 73
End: 2018-06-19

## 2018-06-19 NOTE — TELEPHONE ENCOUNTER
Patient calling stating that She did go to an ER Yesterday by ambulance and left  Because there was a 3 hour wait. States She is in so much pain. Asked to talk to Afsaneh Arrington states Patient went to [facility] ER in [place] by ambulance.   Patient demanded to

## 2018-06-19 NOTE — TELEPHONE ENCOUNTER
pain much worse, went to hospital could not get her in after sitting in a hard chair for an hour told her they are crowded, desperate for help and was crying when we hung up

## 2018-07-05 ENCOUNTER — TELEPHONE (OUTPATIENT)
Dept: FAMILY MEDICINE CLINIC | Facility: CLINIC | Age: 73
End: 2018-07-05

## 2018-07-05 DIAGNOSIS — G89.29 CHRONIC BILATERAL LOW BACK PAIN WITHOUT SCIATICA: ICD-10-CM

## 2018-07-05 DIAGNOSIS — M16.12 PRIMARY OSTEOARTHRITIS OF LEFT HIP: ICD-10-CM

## 2018-07-05 DIAGNOSIS — M54.50 CHRONIC BILATERAL LOW BACK PAIN WITHOUT SCIATICA: ICD-10-CM

## 2018-07-05 DIAGNOSIS — I10 ESSENTIAL HYPERTENSION: ICD-10-CM

## 2018-07-05 DIAGNOSIS — G89.4 CHRONIC PAIN SYNDROME: ICD-10-CM

## 2018-07-05 DIAGNOSIS — I73.9 PERIPHERAL VASCULAR DISEASE (HCC): ICD-10-CM

## 2018-07-05 NOTE — TELEPHONE ENCOUNTER
Spoke with patient's daughter Molina Barrientos who states when patient was here about 2 weeks ago for an appt, Dr. Samia Martin wanted to run labs to check for inflammation due to patient's ongoing back pain since surgery.   Molina Barrientos states they lost track talking about other

## 2018-07-10 RX ORDER — HYDROCHLOROTHIAZIDE 25 MG/1
25 TABLET ORAL DAILY
Qty: 90 TABLET | Refills: 3 | Status: SHIPPED | OUTPATIENT
Start: 2018-07-10 | End: 2018-12-18

## 2018-07-10 NOTE — TELEPHONE ENCOUNTER
Future appt:     Your appointments     Date & Time Appointment Department [facility])    Jul 14, 2018  9:45 AM CDT Laboratory Visit with REF [facility] ([facility] Ref [facility])    Jul 17, 2018  1:00 PM CDT Exam - Established Patient with Liya Kelly

## 2018-07-17 ENCOUNTER — OFFICE VISIT (OUTPATIENT)
Dept: FAMILY MEDICINE CLINIC | Facility: CLINIC | Age: 73
End: 2018-07-17
Payer: MEDICARE

## 2018-07-17 VITALS
TEMPERATURE: 99 F | RESPIRATION RATE: 20 BRPM | HEIGHT: 61 IN | BODY MASS INDEX: 24.62 KG/M2 | SYSTOLIC BLOOD PRESSURE: 118 MMHG | HEART RATE: 116 BPM | DIASTOLIC BLOOD PRESSURE: 64 MMHG | WEIGHT: 130.38 LBS

## 2018-07-17 DIAGNOSIS — J44.9 CHRONIC OBSTRUCTIVE PULMONARY DISEASE, UNSPECIFIED COPD TYPE (HCC): ICD-10-CM

## 2018-07-17 DIAGNOSIS — G89.4 CHRONIC PAIN SYNDROME: Primary | ICD-10-CM

## 2018-07-17 DIAGNOSIS — I10 ESSENTIAL HYPERTENSION: ICD-10-CM

## 2018-07-17 DIAGNOSIS — F33.1 MODERATE EPISODE OF RECURRENT MAJOR DEPRESSIVE DISORDER (HCC): ICD-10-CM

## 2018-07-17 DIAGNOSIS — M16.10: ICD-10-CM

## 2018-07-17 PROCEDURE — 99213 OFFICE O/P EST LOW 20 MIN: CPT | Performed by: FAMILY MEDICINE

## 2018-07-17 RX ORDER — PAROXETINE 10 MG/1
10 TABLET, FILM COATED ORAL EVERY MORNING
Qty: 30 TABLET | Refills: 5 | Status: SHIPPED | OUTPATIENT
Start: 2018-07-17 | End: 2018-07-24

## 2018-07-17 NOTE — PROGRESS NOTES
[address]  PROGRESS NOTE  Chief Complaint:   Patient presents with: Follow - Up      HPI:   This is a 67year old female coming in for follow-up. When she was here last she was complaining of excruciating pain in her right hip.   We tr x10(3) uL   Lymphocyte Absolute 2.85 0.90 - 4.00 x10(3) uL   Monocyte Absolute 0.56 0.10 - 0.60 x10(3) uL   Eosinophil Absolute 0.24 0.00 - 0.30 x10(3) uL   Basophil Absolute 0.04 0.00 - 0.10 x10(3) uL   Immature Granulocyte Absolute 0.03 0.00 - 1.00 x10(3 for Anxiety. Disp: 90 tablet Rfl: 1   TraMADol HCl 50 MG Oral Tab Take 2 tablets (100 mg total) by mouth every 6 (six) hours as needed for Pain. Limit 8/day.  Disp: 240 tablet Rfl: 1   Albuterol Sulfate HFA (PROAIR HFA) 108 (90 Base) MCG/ACT Inhalation Aero Denies abdominal pain, nausea, vomiting, constipation, diarrhea, or blood in stool. MUSCULOSKELETAL:  Denies weakness, muscle aches, back pain, joint pain, swelling or stiffness.   NEUROLOGICAL:  Denies headache, seizures, dizziness, syncope, paralysis, at normal in all 4 quadrants, no masses, no hepatosplenomegaly. EXTREMITIES: She has an obvious clunk with movement of her right hip. NEURO: She is noted to have hemiparesis of the right side. ASSESSMENT AND PLAN:   1.  Chronic pain syndrome  She has  effects or complications from the treatments as a result of today.      Problem List:  Patient Active Problem List:     Abnormal electrocardiogram     Family history of ischemic heart disease     COPD (chronic obstructive pulmonary disease) ([facility] [address]     Hemipl

## 2018-07-21 ENCOUNTER — TELEPHONE (OUTPATIENT)
Dept: FAMILY MEDICINE CLINIC | Facility: CLINIC | Age: 73
End: 2018-07-21

## 2018-07-21 NOTE — TELEPHONE ENCOUNTER
Original Phone Note was opened wrong Patient. Patient called stating taking Her Paxil in the Morning is causing some leg weakness. Wonders if She can take medication at night. Per Dr Kera Morales on original note-paxil can be taken anytime of the day.

## 2018-07-23 ENCOUNTER — TELEPHONE (OUTPATIENT)
Dept: FAMILY MEDICINE CLINIC | Facility: CLINIC | Age: 73
End: 2018-07-23

## 2018-07-23 NOTE — TELEPHONE ENCOUNTER
Patient states her legs are very weak with taking Paxil. Did not help to change Rx to evening. States her leg and stroke arm are weak. Asked if She felt She was having a stroke--States no. Will not take Paxil today.   Requesting Dr Pilar Stewart recomm

## 2018-07-24 RX ORDER — BUPROPION HYDROCHLORIDE 75 MG/1
75 TABLET ORAL 2 TIMES DAILY
Qty: 60 TABLET | Refills: 5 | Status: SHIPPED | OUTPATIENT
Start: 2018-07-24 | End: 2018-07-30

## 2018-07-24 NOTE — TELEPHONE ENCOUNTER
I am sorry that this reaction occurred. Please stop taking the Paxil. We need to give it several days to get out of the system and then start taking Wellbutrin 75 mg twice a day. I will send in a very small prescription.   This is a totally different cla

## 2018-07-25 RX ORDER — ONDANSETRON 4 MG/1
TABLET, FILM COATED ORAL
Qty: 30 TABLET | Refills: 3 | Status: SHIPPED | OUTPATIENT
Start: 2018-07-25 | End: 2018-09-04

## 2018-07-25 NOTE — TELEPHONE ENCOUNTER
Future appt:     Your appointments     Date & Time Appointment Department [facility])    Jul 26, 2018 10:15 AM CDT Laboratory Visit with REF [facility] Smalls [facility] ([facility])    Sep 04, 2018 11:30 AM CDT Presurgical with Caro Beltran

## 2018-07-26 ENCOUNTER — LABORATORY ENCOUNTER (OUTPATIENT)
Dept: LAB | Age: 73
End: 2018-07-26
Attending: FAMILY MEDICINE
Payer: MEDICARE

## 2018-07-26 DIAGNOSIS — I10 ESSENTIAL HYPERTENSION: ICD-10-CM

## 2018-07-26 DIAGNOSIS — M16.12 PRIMARY OSTEOARTHRITIS OF LEFT HIP: ICD-10-CM

## 2018-07-26 DIAGNOSIS — G89.29 CHRONIC BILATERAL LOW BACK PAIN WITHOUT SCIATICA: ICD-10-CM

## 2018-07-26 DIAGNOSIS — G89.4 CHRONIC PAIN SYNDROME: ICD-10-CM

## 2018-07-26 DIAGNOSIS — I73.9 PERIPHERAL VASCULAR DISEASE (HCC): ICD-10-CM

## 2018-07-26 DIAGNOSIS — M54.50 CHRONIC BILATERAL LOW BACK PAIN WITHOUT SCIATICA: ICD-10-CM

## 2018-07-26 LAB
ALBUMIN SERPL-MCNC: 4 G/DL (ref 3.5–4.8)
ALBUMIN/GLOB SERPL: 1.2 {RATIO} (ref 1–2)
ALP LIVER SERPL-CCNC: 108 U/L (ref 55–142)
ALT SERPL-CCNC: 14 U/L (ref 14–54)
ANION GAP SERPL CALC-SCNC: 9 MMOL/L (ref 0–18)
AST SERPL-CCNC: 11 U/L (ref 15–41)
BASOPHILS # BLD AUTO: 0.05 X10(3) UL (ref 0–0.1)
BASOPHILS NFR BLD AUTO: 0.4 %
BILIRUB SERPL-MCNC: 0.8 MG/DL (ref 0.1–2)
BUN BLD-MCNC: 24 MG/DL (ref 8–20)
BUN/CREAT SERPL: 28.9 (ref 10–20)
CALCIUM BLD-MCNC: 9.7 MG/DL (ref 8.3–10.3)
CHLORIDE SERPL-SCNC: 102 MMOL/L (ref 101–111)
CO2 SERPL-SCNC: 27 MMOL/L (ref 22–32)
CREAT BLD-MCNC: 0.83 MG/DL (ref 0.55–1.02)
EOSINOPHIL # BLD AUTO: 0.2 X10(3) UL (ref 0–0.3)
EOSINOPHIL NFR BLD AUTO: 1.8 %
ERYTHROCYTE [DISTWIDTH] IN BLOOD BY AUTOMATED COUNT: 15.9 % (ref 11.5–16)
GLOBULIN PLAS-MCNC: 3.3 G/DL (ref 2.5–3.7)
GLUCOSE BLD-MCNC: 100 MG/DL (ref 70–99)
HCT VFR BLD AUTO: 46.3 % (ref 34–50)
HGB BLD-MCNC: 15.2 G/DL (ref 12–16)
IMMATURE GRANULOCYTE COUNT: 0.04 X10(3) UL (ref 0–1)
IMMATURE GRANULOCYTE RATIO %: 0.4 %
LYMPHOCYTES # BLD AUTO: 3.2 X10(3) UL (ref 0.9–4)
LYMPHOCYTES NFR BLD AUTO: 28.3 %
M PROTEIN MFR SERPL ELPH: 7.3 G/DL (ref 6.1–8.3)
MCH RBC QN AUTO: 31.8 PG (ref 27–33.2)
MCHC RBC AUTO-ENTMCNC: 32.8 G/DL (ref 31–37)
MCV RBC AUTO: 96.9 FL (ref 81–100)
MONOCYTES # BLD AUTO: 0.81 X10(3) UL (ref 0.1–1)
MONOCYTES NFR BLD AUTO: 7.2 %
NEUTROPHIL ABS PRELIM: 7.01 X10 (3) UL (ref 1.3–6.7)
NEUTROPHILS # BLD AUTO: 7.01 X10(3) UL (ref 1.3–6.7)
NEUTROPHILS NFR BLD AUTO: 61.9 %
OSMOLALITY SERPL CALC.SUM OF ELEC: 290 MOSM/KG (ref 275–295)
PLATELET # BLD AUTO: 388 10(3)UL (ref 150–450)
POTASSIUM SERPL-SCNC: 4.1 MMOL/L (ref 3.6–5.1)
RBC # BLD AUTO: 4.78 X10(6)UL (ref 3.8–5.1)
RED CELL DISTRIBUTION WIDTH-SD: 57.1 FL (ref 35.1–46.3)
RHEUMATOID FACT SERPL-ACNC: <10 IU/ML (ref ?–15)
SED RATE-ML: 8 MM/HR (ref 0–25)
SODIUM SERPL-SCNC: 138 MMOL/L (ref 136–144)
WBC # BLD AUTO: 11.3 X10(3) UL (ref 4–13)

## 2018-07-26 PROCEDURE — 85652 RBC SED RATE AUTOMATED: CPT

## 2018-07-26 PROCEDURE — 36415 COLL VENOUS BLD VENIPUNCTURE: CPT

## 2018-07-26 PROCEDURE — 86431 RHEUMATOID FACTOR QUANT: CPT

## 2018-07-26 PROCEDURE — 80053 COMPREHEN METABOLIC PANEL: CPT

## 2018-07-26 PROCEDURE — 85025 COMPLETE CBC W/AUTO DIFF WBC: CPT

## 2018-07-26 PROCEDURE — 86038 ANTINUCLEAR ANTIBODIES: CPT

## 2018-07-30 ENCOUNTER — TELEPHONE (OUTPATIENT)
Dept: FAMILY MEDICINE CLINIC | Facility: CLINIC | Age: 73
End: 2018-07-30

## 2018-07-30 RX ORDER — BUPROPION HYDROCHLORIDE 75 MG/1
75 TABLET ORAL 2 TIMES DAILY
Qty: 60 TABLET | Refills: 5 | COMMUNITY
Start: 2018-07-30 | End: 2018-12-18

## 2018-07-30 NOTE — TELEPHONE ENCOUNTER
Patient informed of below. Expressed understanding. Patient states She is going to hold off on taking Wellbutrin. States Strength is back in Her Leg. Not feeling as depressed.   Lori Jacob, 07/30/18, 11:05 AM

## 2018-07-30 NOTE — TELEPHONE ENCOUNTER
----- Message from Fabio Redd MD sent at 7/30/2018  8:03 AM CDT -----  Please call Shana. Her labs actually look very good. Her sed rate, a test for inflammation, is normal at 8.   Her chemistry profile is normal.  Her kidney function is normal.

## 2018-08-07 RX ORDER — TRAMADOL HYDROCHLORIDE 50 MG/1
TABLET ORAL
Qty: 240 TABLET | Refills: 0 | Status: SHIPPED
Start: 2018-08-07 | End: 2018-09-04

## 2018-08-07 NOTE — TELEPHONE ENCOUNTER
Future appt:     Your appointments     Date & Time Appointment Department [facility])    Sep 04, 2018 11:30 AM CDT Presurgical with Hank Gomez MD [facility])        [address] [address],

## 2018-08-14 RX ORDER — AMLODIPINE BESYLATE 10 MG/1
TABLET ORAL
Qty: 90 TABLET | Refills: 3 | Status: SHIPPED | OUTPATIENT
Start: 2018-08-14 | End: 2018-12-18

## 2018-08-14 RX ORDER — BENAZEPRIL HYDROCHLORIDE 40 MG/1
TABLET, FILM COATED ORAL
Qty: 90 TABLET | Refills: 3 | Status: SHIPPED | OUTPATIENT
Start: 2018-08-14 | End: 2019-03-19

## 2018-08-14 NOTE — TELEPHONE ENCOUNTER
Future appt:     Your appointments     Date & Time Appointment Department [facility])    Sep 04, 2018 11:30 AM CDT Presurgical with Nanci Stewart MD [address] [place] [address], [facility]        [employer],

## 2018-08-16 RX ORDER — ALPRAZOLAM 0.5 MG/1
TABLET ORAL
Qty: 90 TABLET | Refills: 0 | Status: SHIPPED
Start: 2018-08-16 | End: 2018-10-02

## 2018-08-16 NOTE — TELEPHONE ENCOUNTER
Future appt:     Your appointments     Date & Time Appointment Department [facility])    Sep 04, 2018 11:30 AM CDT Presurgical with Nanci Stewart MD [facility]        [address],

## 2018-09-04 NOTE — PROGRESS NOTES
[facility] [place]  PROGRESS NOTE  Chief Complaint:   Patient presents with:  Depression: Grief  Follow - Up      HPI:   This is a 67year old female coming in for depression follow-up.   She said that the Paxil which we have prescribed at her la Range   Sed Rate 8 0 - 25 mm/Hr   -OLIVE, DIRECT SCREEN   Result Value Ref Range   Olive Screen Equivocal (A) Negative   -RHEUMATOID ARTHRITIS FACTOR   Result Value Ref Range   Rheumatoid Factor <10 <15 IU/mL   -CBC W/ DIFFERENTIAL   Result Value Ref Range   W [Duloxeti*        Comment:Other reaction(s): profound weakness  Sertraline Hcl  [Zo*        Comment:Other reaction(s): Profound weakness; gradual             effect  Lyrica [Pregabalin]     RESTLESSNESS  [place]                    Percocet  [Oxycodon* Disp:  Rfl:    Senna 8.6 MG Oral Tab Take 1 tablet (8.6 mg total) by mouth 2 (two) times daily.  Disp: 180 tablet Rfl: 3      Ready to quit: Not Answered  Counseling given: Not Answered       REVIEW OF SYSTEMS:   CONSTITUTIONAL:  Denies unusual weight gain/ oriented, well developed, well nourished, no apparent distress. She looks better today. She did cry when she was talking about family situations but seemed to be in much less pain today.   HEENT:  Head:  Normocephalic, atraumatic Eyes: EOMI, PERRLA, no sc pain syndrome is under better control now. 7. Primary osteoarthritis involving multiple joints  She does have primary osteoarthritis in multiple joints. The arthritis in the hip seems to be less significant.     8. Primary osteoarthritis of left hip  Sh

## 2018-10-03 RX ORDER — ALPRAZOLAM 0.5 MG/1
TABLET ORAL
Qty: 90 TABLET | Refills: 0 | Status: SHIPPED
Start: 2018-10-03 | End: 2018-10-29

## 2018-10-03 NOTE — TELEPHONE ENCOUNTER
Future appt:    Last Appointment:  9/4/2018  No results found for: CHOLEST, HDL, LDL, TRIGLY, TRIG  No results found for: EAG, A1C  Lab Results   Component Value Date    TSH 1.660 12/18/2017       No Follow-up on file.

## 2018-10-29 RX ORDER — ALPRAZOLAM 0.5 MG/1
TABLET ORAL
Qty: 90 TABLET | Refills: 0 | Status: SHIPPED
Start: 2018-10-29 | End: 2018-11-26

## 2018-10-29 RX ORDER — TRAMADOL HYDROCHLORIDE 50 MG/1
TABLET ORAL
Qty: 240 TABLET | Refills: 0 | Status: SHIPPED
Start: 2018-10-29 | End: 2018-11-26

## 2018-10-29 NOTE — TELEPHONE ENCOUNTER
Future appt:    Last Appointment:  9/4/2018; No f/u recommended    No results found for: CHOLEST, HDL, LDL, TRIGLY, TRIG  No results found for: EAG, A1C  Lab Results   Component Value Date    TSH 1.660 12/18/2017     Last RF:  9/4/2018 and 10/3/2018    No

## 2018-11-26 RX ORDER — ALPRAZOLAM 0.5 MG/1
TABLET ORAL
Qty: 90 TABLET | Refills: 0 | Status: SHIPPED
Start: 2018-11-26 | End: 2018-12-24

## 2018-11-26 RX ORDER — TRAMADOL HYDROCHLORIDE 50 MG/1
TABLET ORAL
Qty: 240 TABLET | Refills: 0 | Status: SHIPPED
Start: 2018-11-26 | End: 2018-12-24

## 2018-11-27 ENCOUNTER — TELEPHONE (OUTPATIENT)
Dept: FAMILY MEDICINE CLINIC | Facility: CLINIC | Age: 73
End: 2018-11-27

## 2018-11-27 NOTE — TELEPHONE ENCOUNTER
Patient would like to know if dr received refill request from pharmacy for her xanax and tramadol medications. Please call patient back to let her know.

## 2018-12-01 ENCOUNTER — TELEPHONE (OUTPATIENT)
Dept: FAMILY MEDICINE CLINIC | Facility: CLINIC | Age: 73
End: 2018-12-01

## 2018-12-01 NOTE — TELEPHONE ENCOUNTER
Dr. Nohemy Schmid out of office today. Dr.T. Mix are you aware of recall on amlodipine? Please advise what patient should do. Thank you.

## 2018-12-01 NOTE — TELEPHONE ENCOUNTER
Per Dr. Rice Scot patient to contact pharmacy to verify if her particular prescription has been recalled. Patient recently had skin cancer surgery and states she does not want to take amlodipine anymore even if pharmacy says it is okay.  Would like to

## 2018-12-04 ENCOUNTER — TELEPHONE (OUTPATIENT)
Dept: FAMILY MEDICINE CLINIC | Facility: CLINIC | Age: 73
End: 2018-12-04

## 2018-12-04 NOTE — TELEPHONE ENCOUNTER
Please let me clarify the news story. Amlodipine was not recalled. What was recalled was a drug containing a combination of amlodipine and valsartan. It was the valsartan that was recalled.   Only the people who were taking the combination drug which con

## 2018-12-04 NOTE — TELEPHONE ENCOUNTER
RE:   Amlodapine recall   -  causing CA     needs to have RX changed   ( called Sat and has not heard back from you since )     please advise

## 2018-12-04 NOTE — TELEPHONE ENCOUNTER
Please see phone note from Sat 12/1. Phone note had been signed. Please advise.   Flori Dasilva, 12/04/18, 9:53 AM

## 2018-12-04 NOTE — TELEPHONE ENCOUNTER
Patient informed of below. Patient does not want to take the Amlodipine at all. Appt given Tues 12/11 with Dr Nena Siu to discuss medication change. Anamika López, 12/04/18, 11:06 AM    Future appt:     Your appointments     Date & Time Appointment Depar

## 2018-12-18 NOTE — PROGRESS NOTES
[address]  PROGRESS NOTE  Chief Complaint:   Patient presents with:  Medication Problem: Amlodipine  Cramps: In both calfs      HPI:   This is a 75year old female coming in for follow-up. She has several concerns today.     First, she Ref Range    Rheumatoid Factor <10 <15 IU/mL   CBC W/ DIFFERENTIAL   Result Value Ref Range    WBC 11.3 4.0 - 13.0 x10(3) uL    RBC 4.78 3.80 - 5.10 x10(6)uL    HGB 15.2 12.0 - 16.0 g/dL    HCT 46.3 34.0 - 50.0 %    .0 150.0 - 450.0 10(3)uL    MCV 9 weakness; gradual             effect  Lyrica [Pregabalin]     RESTLESSNESS  [place]                    Percocet  [Oxycodon*        Comment:Other reaction(s): Nausea  Paxil [Paroxetine]      FATIGUE  Current Meds:    Current Outpatient Medications:  hydrochl her energy level is not as good now as it used to be. EENT:  Eyes:  Denies eye pain, visual loss, blurred vision, double vision or yellow sclerae. Ears, Nose, Throat:  Denies hearing loss, sneezing, congestion, runny nose or sore throat.   INTEGUMENTARY: patent, no nasal discharge Throat:  No tonsillar erythema or exudate. Mouth:  No oral lesions or ulcerations, good dentition. NECK: Supple, no CLAD, no JVD, no thyromegaly. SKIN: No rashes, no skin lesion, no bruising, good turgor.   HEART:  Regular rate hydrochlorothiazide 25 MG Oral Tab 90 tablet 3     Sig: Take 1 tablet (25 mg total) by mouth daily. • Budesonide-Formoterol Fumarate (SYMBICORT) 160-4.5 MCG/ACT Inhalation Aerosol 3 Inhaler 3     Sig: Inhale 2 puffs into the lungs 2 (two) times daily.

## 2018-12-26 RX ORDER — TRAMADOL HYDROCHLORIDE 50 MG/1
TABLET ORAL
Qty: 240 TABLET | Refills: 0 | Status: SHIPPED
Start: 2018-12-26 | End: 2019-01-23

## 2018-12-26 RX ORDER — ALPRAZOLAM 0.5 MG/1
TABLET ORAL
Qty: 90 TABLET | Refills: 0 | Status: SHIPPED
Start: 2018-12-26 | End: 2019-02-06

## 2018-12-26 NOTE — TELEPHONE ENCOUNTER
Future appt:     Your appointments     Date & Time Appointment Department [facility])    Mar 19, 2019 10:30 AM CDT Follow up with Merly Stroud MD 25 [facility], [address]

## 2019-01-04 ENCOUNTER — TELEPHONE (OUTPATIENT)
Dept: FAMILY MEDICINE CLINIC | Facility: CLINIC | Age: 74
End: 2019-01-04

## 2019-01-04 NOTE — TELEPHONE ENCOUNTER
RE:  RX  that is to be divided by 1/3     3x daily      -    finding it to be challenging              please call

## 2019-01-04 NOTE — TELEPHONE ENCOUNTER
Patient states She is having a difficult time with cutting the Buspirone 10mg tabs in half. Phoned [facility] regarding Rx. Pharmacist states Buspirone 5mg tabs are on backorder. Patient informed of backorder.   States She will have someone cut the 10mg

## 2019-01-09 ENCOUNTER — TELEPHONE (OUTPATIENT)
Dept: FAMILY MEDICINE CLINIC | Facility: CLINIC | Age: 74
End: 2019-01-09

## 2019-01-09 NOTE — TELEPHONE ENCOUNTER
Her vision is blurry--unsure if it is due to her medication (although it is not a stated side affect). Please call back.

## 2019-01-09 NOTE — TELEPHONE ENCOUNTER
Please stop taking the buspirone. Call and let us know what is happening with the blurriness in the eyes on Friday.   If it is not getting better than the next step will be to see an ophthalmologist.

## 2019-01-09 NOTE — TELEPHONE ENCOUNTER
Patient states Her Left Eye went blurry and now Right Eye is getting blurry. Feels it is due to Buspirone. It is the only new medication She has started. Please advise.   Bonita Kincaid, 01/09/19, 4:42 PM

## 2019-01-16 ENCOUNTER — TELEPHONE (OUTPATIENT)
Dept: FAMILY MEDICINE CLINIC | Facility: CLINIC | Age: 74
End: 2019-01-16

## 2019-01-16 NOTE — TELEPHONE ENCOUNTER
Patient states the blurryness cleared up after stopping the AntiDepressant. States it took 3 days. Wanted Dr Hernandez aware.   Rufina Miranda, 01/16/19, 4:00 PM

## 2019-01-16 NOTE — TELEPHONE ENCOUNTER
Patient wanted to let the nurse know that her blurriness is gone and she that she has an appointment with the eye Dr on Monday.

## 2019-01-23 RX ORDER — TRAMADOL HYDROCHLORIDE 50 MG/1
TABLET ORAL
Qty: 240 TABLET | Refills: 1 | Status: SHIPPED
Start: 2019-01-23 | End: 2019-03-19

## 2019-01-23 NOTE — TELEPHONE ENCOUNTER
Future appt:     Your appointments     Date & Time Appointment Department [facility])    Mar 19, 2019 10:30 AM CDT Medicare Annual Well Visit with Mayda Albert MD [address] [place] [address], [address]        [facility]

## 2019-02-06 RX ORDER — ALPRAZOLAM 0.5 MG/1
TABLET ORAL
Qty: 90 TABLET | Refills: 0 | Status: SHIPPED
Start: 2019-02-06 | End: 2019-03-19

## 2019-02-06 RX ORDER — BUSPIRONE HYDROCHLORIDE 10 MG/1
TABLET ORAL
Qty: 45 TABLET | Refills: 5 | Status: SHIPPED | OUTPATIENT
Start: 2019-02-06 | End: 2019-03-19

## 2019-02-06 NOTE — TELEPHONE ENCOUNTER
Future Appointments   Date Time Provider Arolod Lopez   3/19/2019 10:30 AM Rony Ken MD [facility] [place] [facility]

## 2019-03-12 ENCOUNTER — TELEPHONE (OUTPATIENT)
Dept: FAMILY MEDICINE CLINIC | Facility: CLINIC | Age: 74
End: 2019-03-12

## 2019-03-19 ENCOUNTER — APPOINTMENT (OUTPATIENT)
Dept: LAB | Age: 74
End: 2019-03-19
Attending: FAMILY MEDICINE
Payer: MEDICARE

## 2019-03-19 ENCOUNTER — OFFICE VISIT (OUTPATIENT)
Dept: FAMILY MEDICINE CLINIC | Facility: CLINIC | Age: 74
End: 2019-03-19
Payer: MEDICARE

## 2019-03-19 VITALS
TEMPERATURE: 98 F | SYSTOLIC BLOOD PRESSURE: 120 MMHG | DIASTOLIC BLOOD PRESSURE: 86 MMHG | RESPIRATION RATE: 18 BRPM | HEIGHT: 62 IN | WEIGHT: 139 LBS | HEART RATE: 88 BPM | BODY MASS INDEX: 25.58 KG/M2

## 2019-03-19 DIAGNOSIS — Z23 NEED FOR VACCINATION: ICD-10-CM

## 2019-03-19 DIAGNOSIS — F33.1 MODERATE EPISODE OF RECURRENT MAJOR DEPRESSIVE DISORDER (HCC): ICD-10-CM

## 2019-03-19 DIAGNOSIS — Z00.00 ENCOUNTER FOR ANNUAL HEALTH EXAMINATION: Primary | ICD-10-CM

## 2019-03-19 DIAGNOSIS — G89.29 CHRONIC BILATERAL LOW BACK PAIN WITHOUT SCIATICA: ICD-10-CM

## 2019-03-19 DIAGNOSIS — G89.4 CHRONIC PAIN SYNDROME: ICD-10-CM

## 2019-03-19 DIAGNOSIS — I69.351 FLACCID HEMIPLEGIA OF RIGHT DOMINANT SIDE AS LATE EFFECT OF CEREBRAL INFARCTION (HCC): ICD-10-CM

## 2019-03-19 DIAGNOSIS — I73.9 PERIPHERAL VASCULAR DISEASE (HCC): ICD-10-CM

## 2019-03-19 DIAGNOSIS — M54.50 CHRONIC BILATERAL LOW BACK PAIN WITHOUT SCIATICA: ICD-10-CM

## 2019-03-19 DIAGNOSIS — E78.49 FAMILIAL MULTIPLE LIPOPROTEIN-TYPE HYPERLIPIDEMIA: ICD-10-CM

## 2019-03-19 DIAGNOSIS — Z13.6 SCREENING FOR CARDIOVASCULAR CONDITION: ICD-10-CM

## 2019-03-19 DIAGNOSIS — R11.0 NAUSEA: ICD-10-CM

## 2019-03-19 DIAGNOSIS — M54.31 BILATERAL SCIATICA: ICD-10-CM

## 2019-03-19 DIAGNOSIS — M15.9 PRIMARY OSTEOARTHRITIS INVOLVING MULTIPLE JOINTS: ICD-10-CM

## 2019-03-19 DIAGNOSIS — F17.200 TOBACCO USE DISORDER: ICD-10-CM

## 2019-03-19 DIAGNOSIS — M54.32 BILATERAL SCIATICA: ICD-10-CM

## 2019-03-19 DIAGNOSIS — I10 ESSENTIAL HYPERTENSION: ICD-10-CM

## 2019-03-19 DIAGNOSIS — J44.9 CHRONIC OBSTRUCTIVE PULMONARY DISEASE, UNSPECIFIED COPD TYPE (HCC): ICD-10-CM

## 2019-03-19 LAB
ALBUMIN SERPL-MCNC: 3.9 G/DL (ref 3.4–5)
ALBUMIN/GLOB SERPL: 1.2 {RATIO} (ref 1–2)
ALP LIVER SERPL-CCNC: 88 U/L (ref 55–142)
ALT SERPL-CCNC: 13 U/L (ref 13–56)
ANION GAP SERPL CALC-SCNC: 5 MMOL/L (ref 0–18)
AST SERPL-CCNC: 13 U/L (ref 15–37)
BASOPHILS # BLD AUTO: 0.07 X10(3) UL (ref 0–0.2)
BASOPHILS NFR BLD AUTO: 0.8 %
BILIRUB SERPL-MCNC: 0.4 MG/DL (ref 0.1–2)
BUN BLD-MCNC: 15 MG/DL (ref 7–18)
BUN/CREAT SERPL: 23.1 (ref 10–20)
CALCIUM BLD-MCNC: 9.1 MG/DL (ref 8.5–10.1)
CHLORIDE SERPL-SCNC: 105 MMOL/L (ref 98–107)
CHOLEST SMN-MCNC: 207 MG/DL (ref ?–200)
CO2 SERPL-SCNC: 30 MMOL/L (ref 21–32)
CREAT BLD-MCNC: 0.65 MG/DL (ref 0.55–1.02)
DEPRECATED RDW RBC AUTO: 59.3 FL (ref 35.1–46.3)
EOSINOPHIL # BLD AUTO: 0.25 X10(3) UL (ref 0–0.7)
EOSINOPHIL NFR BLD AUTO: 2.9 %
ERYTHROCYTE [DISTWIDTH] IN BLOOD BY AUTOMATED COUNT: 16.5 % (ref 11–15)
GLOBULIN PLAS-MCNC: 3.2 G/DL (ref 2.8–4.4)
GLUCOSE BLD-MCNC: 96 MG/DL (ref 70–99)
HCT VFR BLD AUTO: 41.6 % (ref 35–48)
HDLC SERPL-MCNC: 40 MG/DL (ref 40–59)
HGB BLD-MCNC: 13.8 G/DL (ref 12–16)
IMM GRANULOCYTES # BLD AUTO: 0.03 X10(3) UL (ref 0–1)
IMM GRANULOCYTES NFR BLD: 0.3 %
LDLC SERPL CALC-MCNC: 129 MG/DL (ref ?–100)
LYMPHOCYTES # BLD AUTO: 2.83 X10(3) UL (ref 1–4)
LYMPHOCYTES NFR BLD AUTO: 32.7 %
M PROTEIN MFR SERPL ELPH: 7.1 G/DL (ref 6.4–8.2)
MCH RBC QN AUTO: 32.5 PG (ref 26–34)
MCHC RBC AUTO-ENTMCNC: 33.2 G/DL (ref 31–37)
MCV RBC AUTO: 98.1 FL (ref 80–100)
MONOCYTES # BLD AUTO: 0.69 X10(3) UL (ref 0.1–1)
MONOCYTES NFR BLD AUTO: 8 %
NEUTROPHILS # BLD AUTO: 4.79 X10 (3) UL (ref 1.5–7.7)
NEUTROPHILS # BLD AUTO: 4.79 X10(3) UL (ref 1.5–7.7)
NEUTROPHILS NFR BLD AUTO: 55.3 %
NONHDLC SERPL-MCNC: 167 MG/DL (ref ?–130)
OSMOLALITY SERPL CALC.SUM OF ELEC: 291 MOSM/KG (ref 275–295)
PLATELET # BLD AUTO: 313 10(3)UL (ref 150–450)
POTASSIUM SERPL-SCNC: 4.5 MMOL/L (ref 3.5–5.1)
RBC # BLD AUTO: 4.24 X10(6)UL (ref 3.8–5.3)
SODIUM SERPL-SCNC: 140 MMOL/L (ref 136–145)
TRIGL SERPL-MCNC: 191 MG/DL (ref 30–149)
TSI SER-ACNC: 1.03 MIU/ML (ref 0.36–3.74)
VLDLC SERPL CALC-MCNC: 38 MG/DL (ref 0–30)
WBC # BLD AUTO: 8.7 X10(3) UL (ref 4–11)

## 2019-03-19 PROCEDURE — G0439 PPPS, SUBSEQ VISIT: HCPCS | Performed by: FAMILY MEDICINE

## 2019-03-19 PROCEDURE — 84443 ASSAY THYROID STIM HORMONE: CPT | Performed by: FAMILY MEDICINE

## 2019-03-19 PROCEDURE — 90670 PCV13 VACCINE IM: CPT | Performed by: FAMILY MEDICINE

## 2019-03-19 PROCEDURE — 99214 OFFICE O/P EST MOD 30 MIN: CPT | Performed by: FAMILY MEDICINE

## 2019-03-19 PROCEDURE — 80053 COMPREHEN METABOLIC PANEL: CPT | Performed by: FAMILY MEDICINE

## 2019-03-19 PROCEDURE — 85025 COMPLETE CBC W/AUTO DIFF WBC: CPT | Performed by: FAMILY MEDICINE

## 2019-03-19 PROCEDURE — 36415 COLL VENOUS BLD VENIPUNCTURE: CPT | Performed by: FAMILY MEDICINE

## 2019-03-19 PROCEDURE — 80061 LIPID PANEL: CPT | Performed by: FAMILY MEDICINE

## 2019-03-19 PROCEDURE — G0009 ADMIN PNEUMOCOCCAL VACCINE: HCPCS | Performed by: FAMILY MEDICINE

## 2019-03-19 RX ORDER — BENAZEPRIL HYDROCHLORIDE 40 MG/1
40 TABLET, FILM COATED ORAL
Qty: 90 TABLET | Refills: 3 | Status: SHIPPED | OUTPATIENT
Start: 2019-03-19 | End: 2020-07-01

## 2019-03-19 RX ORDER — ALPRAZOLAM 0.5 MG/1
TABLET ORAL
Qty: 90 TABLET | Refills: 0 | Status: SHIPPED | OUTPATIENT
Start: 2019-03-19 | End: 2019-04-23

## 2019-03-19 RX ORDER — TRAMADOL HYDROCHLORIDE 50 MG/1
TABLET ORAL
Qty: 240 TABLET | Refills: 1 | Status: SHIPPED | OUTPATIENT
Start: 2019-03-19 | End: 2019-05-14

## 2019-03-19 RX ORDER — ONDANSETRON 4 MG/1
4 TABLET, FILM COATED ORAL EVERY 8 HOURS PRN
Qty: 30 TABLET | Refills: 2 | Status: SHIPPED | OUTPATIENT
Start: 2019-03-19 | End: 2019-11-24

## 2019-03-19 NOTE — PATIENT INSTRUCTIONS
Check labs today. Nausea may be coming from the Tramadol. Recommended Websites for Advanced Directives    [email or web address]  An information packet, including necessary form from the [facility]

## 2019-03-19 NOTE — PROGRESS NOTES
[facility] [place]  PROGRESS NOTE  Chief Complaint:   Patient presents with:  Physical  Nausea  Loss Of Appetite      HPI:   This is a 75year old female coming in for her annual Medicare wellness visit. She said she is not feeling well at all. x10(3) uL    RBC 4.78 3.80 - 5.10 x10(6)uL    HGB 15.2 12.0 - 16.0 g/dL    HCT 46.3 34.0 - 50.0 %    .0 150.0 - 450.0 10(3)uL    MCV 96.9 81.0 - 100.0 fL    MCH 31.8 27.0 - 33.2 pg    MCHC 32.8 31.0 - 37.0 g/dL    RDW 15.9 11.5 - 16.0 %    RDW-SD 57 [Oxycodon*        Comment:Other reaction(s): Nausea  Paxil [Paroxetine]      FATIGUE  Current Meds:    Current Outpatient Medications:  ALPRAZolam 0.5 MG Oral Tab TAKE 1 TABLET BY MOUTH THREE TIMES DAILY AS NEEDED FOR ANXIETY Disp: 90 tablet Rfl: 0   traMA edema, dyspnea on exertion or at rest.  RESPIRATORY: She does cough. She gets short of breath with exertion. GASTROINTESTINAL:  Denies abdominal pain, nausea, vomiting, constipation, diarrhea, or blood in stool. MUSCULOSKELETAL: She hurts all over.   She throughout her low back. EXTREMITIES: No edema noted. No dorsalis pedis pulses noted in either foot. NEURO:  No deficit, normal gait, strength and tone, sensory intact, normal reflexes. ASSESSMENT AND PLAN:   1.  Encounter for annual health examinatio REFLEX TO FREE T4; Future  - CBC WITH DIFFERENTIAL WITH PLATELET; Future  - LIPID PANEL  - COMP METABOLIC PANEL (14)  - TSH W REFLEX TO FREE T4  - CBC WITH DIFFERENTIAL WITH PLATELET  - CBC W/ DIFFERENTIAL    7.  Moderate episode of recurrent major depressi MAXIMUM DAILY DOSE IS 8 TABLETS   • Ondansetron HCl (ZOFRAN) 4 mg tablet 30 tablet 2     Sig: Take 1 tablet (4 mg total) by mouth every 8 (eight) hours as needed for Nausea.    • Benazepril HCl 40 MG Oral Tab 90 tablet 3     Sig: Take 1 tablet (40 mg total)

## 2019-03-20 ENCOUNTER — TELEPHONE (OUTPATIENT)
Dept: FAMILY MEDICINE CLINIC | Facility: CLINIC | Age: 74
End: 2019-03-20

## 2019-03-20 NOTE — TELEPHONE ENCOUNTER
----- Message from Addie Pichardo MD sent at 3/20/2019 12:31 PM CDT -----  [facility] Her cholesterol is slightly elevated at 207. Her HDL cholesterol is normal at 40. Her blood sugar is normal so no signs of diabetes.   Her kidney function

## 2019-04-24 RX ORDER — ALPRAZOLAM 0.5 MG/1
TABLET ORAL
Qty: 90 TABLET | Refills: 0 | Status: SHIPPED
Start: 2019-04-24 | End: 2019-05-23

## 2019-04-24 NOTE — TELEPHONE ENCOUNTER
Future appt:    Last Appointment:  3/19/2019  Cholesterol, Total (mg/dL)   Date Value   03/19/2019 207 (H)     HDL Cholesterol (mg/dL)   Date Value   03/19/2019 40     LDL Cholesterol (mg/dL)   Date Value   03/19/2019 129 (H)     Triglycerides (mg/dL)   Da

## 2019-05-07 ENCOUNTER — TELEPHONE (OUTPATIENT)
Dept: FAMILY MEDICINE CLINIC | Facility: CLINIC | Age: 74
End: 2019-05-07

## 2019-05-07 NOTE — TELEPHONE ENCOUNTER
Patient did follow up with Ophthalmologist  Regarding fuzzy vision. Ophthalmologist did confirm that Buspirone   Most likely did cause Patient's fuzzy vision and upset stomach due to small stature. Wanted Dr. Lind Gentleman aware.   Doe Griffiths, 05/07/19, 2:5

## 2019-05-07 NOTE — TELEPHONE ENCOUNTER
Thank you for letting us know. I am sorry that the buspirone seems to have caused those effects. We obviously will not prescribe it for you again.

## 2019-05-07 NOTE — TELEPHONE ENCOUNTER
her fuzzy eye issue?  stopped taking meds, saw ophthalmologist, infected eye, might have lazar surgery, her good eye!

## 2019-05-14 RX ORDER — ALBUTEROL SULFATE 90 UG/1
AEROSOL, METERED RESPIRATORY (INHALATION)
Qty: 25.5 G | Refills: 5 | Status: SHIPPED | OUTPATIENT
Start: 2019-05-14 | End: 2020-09-14

## 2019-05-14 RX ORDER — TRAMADOL HYDROCHLORIDE 50 MG/1
TABLET ORAL
Qty: 240 TABLET | Refills: 0 | Status: SHIPPED
Start: 2019-05-14 | End: 2019-06-11

## 2019-05-24 RX ORDER — ALPRAZOLAM 0.5 MG/1
TABLET ORAL
Qty: 90 TABLET | Refills: 1 | Status: SHIPPED
Start: 2019-05-24 | End: 2019-07-22

## 2019-06-11 RX ORDER — TRAMADOL HYDROCHLORIDE 50 MG/1
TABLET ORAL
Qty: 240 TABLET | Refills: 1 | Status: SHIPPED
Start: 2019-06-11 | End: 2019-08-07

## 2019-06-17 ENCOUNTER — TELEPHONE (OUTPATIENT)
Dept: FAMILY MEDICINE CLINIC | Facility: CLINIC | Age: 74
End: 2019-06-17

## 2019-06-18 RX ORDER — MAGNESIUM OXIDE 400 MG/1
400 TABLET ORAL DAILY
Qty: 90 TABLET | Refills: 1 | Status: SHIPPED | OUTPATIENT
Start: 2019-06-18 | End: 2020-03-25

## 2019-06-18 NOTE — TELEPHONE ENCOUNTER
Patient states she is having extreme fatigue again. States she napping in the morning. Cut xanax to 1 at night. Patient questions if she can take Magnesium for the fatigue and leg cramping? If ok, please advise Mg. Please advise.   Flori Dasilva, 0

## 2019-06-19 RX ORDER — BUDESONIDE AND FORMOTEROL FUMARATE DIHYDRATE 160; 4.5 UG/1; UG/1
AEROSOL RESPIRATORY (INHALATION)
Qty: 3 INHALER | Refills: 1 | Status: SHIPPED | OUTPATIENT
Start: 2019-06-19 | End: 2020-01-03

## 2019-07-23 RX ORDER — ALPRAZOLAM 0.5 MG/1
TABLET ORAL
Qty: 90 TABLET | Refills: 0 | Status: SHIPPED
Start: 2019-07-23 | End: 2019-08-19

## 2019-07-31 ENCOUNTER — TELEPHONE (OUTPATIENT)
Dept: FAMILY MEDICINE CLINIC | Facility: CLINIC | Age: 74
End: 2019-07-31

## 2019-07-31 DIAGNOSIS — I70.218 ATHEROSCLEROSIS OF NATIVE ARTERIES OF EXTREMITIES WITH INTERMITTENT CLAUDICATION, OTHER EXTREMITY (HCC): ICD-10-CM

## 2019-07-31 DIAGNOSIS — M79.662 PAIN IN BOTH LOWER LEGS: Primary | ICD-10-CM

## 2019-07-31 DIAGNOSIS — M79.661 PAIN IN BOTH LOWER LEGS: Primary | ICD-10-CM

## 2019-07-31 NOTE — TELEPHONE ENCOUNTER
I will send in order for ultrasound evaluation of the arterial circulation in both legs. If it shows that there is evidence for a blockage then the next step would be a CT angiogram and then potentially interventional radiology to put in a stent.

## 2019-07-31 NOTE — TELEPHONE ENCOUNTER
----- Message from Wilmar Lacey sent at 7/31/2019 11:20 AM CDT -----  Contact: Braulio Franklin - Patient states testing was done in 2014 by Dr Tiffany Tim or Johnny not in 2017

## 2019-07-31 NOTE — TELEPHONE ENCOUNTER
Patient informed of below/agrees. Order faxed to [facility] Patient Scheduling.   Abdi Larsen, 07/31/19, 1:23 PM

## 2019-07-31 NOTE — TELEPHONE ENCOUNTER
Patient states that she can't really walk, when she starts walking patient feels like a burning sensation on both legs. Would like a call back.

## 2019-07-31 NOTE — TELEPHONE ENCOUNTER
Patient states she is having extreme leg pain when up and walking. It is ok when she is   Laying down. States also has abdominal cramping when she is up walking. It is associated with the leg cramping.     Patient states this is the same pain she had be

## 2019-08-03 ENCOUNTER — TELEPHONE (OUTPATIENT)
Dept: FAMILY MEDICINE CLINIC | Facility: CLINIC | Age: 74
End: 2019-08-03

## 2019-08-03 DIAGNOSIS — I70.218 ATHSCL NATIVE ARTERIES OF EXTRM W INTRMT CLAUD, OTH EXTRM (HCC): Primary | ICD-10-CM

## 2019-08-03 NOTE — TELEPHONE ENCOUNTER
The ultrasound is abnormal and shows definite presence of peripheral vascular disease. The next step is a consultation with Dr. Caty Lopez at [facility] for possible endovascular intervention.   This is arranged by contacting him at his phone number

## 2019-08-05 ENCOUNTER — TELEPHONE (OUTPATIENT)
Dept: FAMILY MEDICINE CLINIC | Facility: CLINIC | Age: 74
End: 2019-08-05

## 2019-08-05 NOTE — TELEPHONE ENCOUNTER
Referal to [facility] IR sent. Patient informed can call for Appt/agreed.   Reza Qureshi, 08/05/19, 2:08 PM

## 2019-08-06 ENCOUNTER — TELEPHONE (OUTPATIENT)
Dept: FAMILY MEDICINE CLINIC | Facility: CLINIC | Age: 74
End: 2019-08-06

## 2019-08-06 NOTE — TELEPHONE ENCOUNTER
Patient will have procedure done at [facility] IR  On Thursday.   Cherie Ortiz, 08/06/19, 11:44 AM

## 2019-08-07 RX ORDER — TRAMADOL HYDROCHLORIDE 50 MG/1
TABLET ORAL
Qty: 240 TABLET | Refills: 1 | Status: SHIPPED
Start: 2019-08-07 | End: 2019-09-30

## 2019-08-07 NOTE — TELEPHONE ENCOUNTER
Future appt:    Last Appointment with provider:   3/19/2019 physical  Last appointment at [facility]:  3/19/2019  Cholesterol, Total (mg/dL)   Date Value   03/19/2019 207 (H)     HDL Cholesterol (mg/dL)   Date Value   03/19/2019 40     LDL Cholesterol (m

## 2019-08-08 ENCOUNTER — TELEPHONE (OUTPATIENT)
Dept: FAMILY MEDICINE CLINIC | Facility: CLINIC | Age: 74
End: 2019-08-08

## 2019-08-08 NOTE — TELEPHONE ENCOUNTER
Nurse states patient is at Hospital for a procedure, requesting most recent H&P for patient faxed to [phone]. Nurse also requested a call back.

## 2019-08-19 RX ORDER — ALPRAZOLAM 0.5 MG/1
TABLET ORAL
Qty: 90 TABLET | Refills: 0 | Status: SHIPPED
Start: 2019-08-19 | End: 2019-09-11

## 2019-08-23 ENCOUNTER — TELEPHONE (OUTPATIENT)
Dept: FAMILY MEDICINE CLINIC | Facility: CLINIC | Age: 74
End: 2019-08-23

## 2019-08-23 NOTE — TELEPHONE ENCOUNTER
Patient wants Dr Agnieszka Pitt to know she won't be able to see him until next month, states she is getting her left leg done and then the right one.

## 2019-08-30 ENCOUNTER — TELEPHONE (OUTPATIENT)
Dept: FAMILY MEDICINE CLINIC | Facility: CLINIC | Age: 74
End: 2019-08-30

## 2019-08-30 NOTE — TELEPHONE ENCOUNTER
Molina Barrientos calling for Patient-  States Patient is in a lot of pain with legs. States Surgery is not scheduled until 9/10. Asking if there is something stronger patient can take for pain other than the Tramadol? Please advise.   Milad Chang, 08/30/19, 10:1

## 2019-08-30 NOTE — TELEPHONE ENCOUNTER
Unfortunately no. The hope is that the surgery will help tremendously with the pain. No new prescriptions recommended now.

## 2019-09-11 RX ORDER — ALPRAZOLAM 0.5 MG/1
TABLET ORAL
Qty: 90 TABLET | Refills: 0 | Status: SHIPPED | OUTPATIENT
Start: 2019-09-11 | End: 2019-10-09

## 2019-09-30 RX ORDER — TRAMADOL HYDROCHLORIDE 50 MG/1
TABLET ORAL
Qty: 240 TABLET | Refills: 0 | Status: SHIPPED | OUTPATIENT
Start: 2019-09-30 | End: 2019-10-28

## 2019-10-01 ENCOUNTER — TELEPHONE (OUTPATIENT)
Dept: FAMILY MEDICINE CLINIC | Facility: CLINIC | Age: 74
End: 2019-10-01

## 2019-10-01 NOTE — TELEPHONE ENCOUNTER
Requesting form for Parking Placard to be filled out and mailed to her. Please advise.   Sonia Jordan, 10/01/19, 3:00 PM

## 2019-10-09 NOTE — TELEPHONE ENCOUNTER
Patient is overdue for an appt. LM for patient to return call. Future appt:     Last Appointment with provider:  3/19/2019; Return in 6 months (on 9/19/2019).      Last appointment at [facility]:  Visit date not found  Cholesterol, Total (mg/dL)   Da

## 2019-10-11 RX ORDER — ALPRAZOLAM 0.5 MG/1
TABLET ORAL
Qty: 90 TABLET | Refills: 0 | Status: SHIPPED | OUTPATIENT
Start: 2019-10-11 | End: 2019-11-11

## 2019-10-28 RX ORDER — TRAMADOL HYDROCHLORIDE 50 MG/1
TABLET ORAL
Qty: 240 TABLET | Refills: 0 | Status: SHIPPED | OUTPATIENT
Start: 2019-10-28 | End: 2019-11-24

## 2019-10-28 NOTE — TELEPHONE ENCOUNTER
Future appt:     Your appointments     Date & Time Appointment Department [facility])    Nov 13, 2019 10:00 AM CST Follow Up Visit with Cathy Nice MD [address], [facility]

## 2019-11-11 RX ORDER — ALPRAZOLAM 0.5 MG/1
TABLET ORAL
Qty: 90 TABLET | Refills: 0 | Status: SHIPPED | OUTPATIENT
Start: 2019-11-11 | End: 2019-12-08

## 2019-11-11 NOTE — TELEPHONE ENCOUNTER
Future appt:     Your appointments     Date & Time Appointment Department [facility])    Nov 13, 2019 10:00 AM CST Follow Up Visit with Rony Ken MD [address] [place] [address], Puja Mendez ([facility]

## 2019-11-25 RX ORDER — TRAMADOL HYDROCHLORIDE 50 MG/1
TABLET ORAL
Qty: 240 TABLET | Refills: 0 | Status: SHIPPED | OUTPATIENT
Start: 2019-11-25 | End: 2019-12-21

## 2019-11-25 RX ORDER — ONDANSETRON 4 MG/1
TABLET, FILM COATED ORAL
Qty: 30 TABLET | Refills: 0 | Status: SHIPPED | OUTPATIENT
Start: 2019-11-25 | End: 2020-01-28

## 2019-11-25 NOTE — TELEPHONE ENCOUNTER
Future appt:     Your appointments     Date & Time Appointment Department [facility])    Dec 17, 2019 11:30 AM CST Follow Up Visit with Aric Rapp MD [address], Aisha Chavez ([facility])            [address] [facility]

## 2019-12-09 RX ORDER — ALPRAZOLAM 0.5 MG/1
TABLET ORAL
Qty: 90 TABLET | Refills: 0 | Status: SHIPPED | OUTPATIENT
Start: 2019-12-09 | End: 2020-01-10

## 2019-12-09 NOTE — TELEPHONE ENCOUNTER
Future appt:     Your appointments     Date & Time Appointment Department [facility])    Dec 17, 2019 11:30 AM CST Follow Up Visit with Coral Issa MD [address] [place] [address] [facility]

## 2019-12-17 ENCOUNTER — OFFICE VISIT (OUTPATIENT)
Dept: FAMILY MEDICINE CLINIC | Facility: CLINIC | Age: 74
End: 2019-12-17
Payer: MEDICARE

## 2019-12-17 VITALS
WEIGHT: 138.63 LBS | HEIGHT: 62 IN | DIASTOLIC BLOOD PRESSURE: 98 MMHG | BODY MASS INDEX: 25.51 KG/M2 | SYSTOLIC BLOOD PRESSURE: 180 MMHG | HEART RATE: 104 BPM | TEMPERATURE: 98 F | RESPIRATION RATE: 16 BRPM

## 2019-12-17 DIAGNOSIS — M16.12 PRIMARY OSTEOARTHRITIS OF LEFT HIP: ICD-10-CM

## 2019-12-17 DIAGNOSIS — I10 ESSENTIAL HYPERTENSION: ICD-10-CM

## 2019-12-17 DIAGNOSIS — I73.9 PAD (PERIPHERAL ARTERY DISEASE) (HCC): Primary | ICD-10-CM

## 2019-12-17 DIAGNOSIS — J43.1 PANLOBULAR EMPHYSEMA (HCC): ICD-10-CM

## 2019-12-17 DIAGNOSIS — F33.1 MODERATE EPISODE OF RECURRENT MAJOR DEPRESSIVE DISORDER (HCC): ICD-10-CM

## 2019-12-17 DIAGNOSIS — F17.200 TOBACCO USE DISORDER: ICD-10-CM

## 2019-12-17 DIAGNOSIS — M15.9 PRIMARY OSTEOARTHRITIS INVOLVING MULTIPLE JOINTS: ICD-10-CM

## 2019-12-17 PROCEDURE — 99214 OFFICE O/P EST MOD 30 MIN: CPT | Performed by: FAMILY MEDICINE

## 2019-12-17 RX ORDER — IBUPROFEN 200 MG
200 TABLET ORAL 2 TIMES DAILY
COMMUNITY

## 2019-12-17 RX ORDER — AMLODIPINE BESYLATE 10 MG/1
10 TABLET ORAL DAILY
Qty: 90 TABLET | Refills: 1 | Status: SHIPPED | OUTPATIENT
Start: 2019-12-17 | End: 2020-09-14

## 2019-12-17 NOTE — PROGRESS NOTES
[address]  PROGRESS NOTE  Chief Complaint:   Patient presents with: Follow - Up      HPI:   This is a 75year old female coming in for follow-up.     She stopped taking the hydrochlorothiazide because it was causing horrible cramps in h VLDL 38 (H) 0 - 30 mg/dL    Non HDL Chol 167 (H) <130 mg/dL   COMP METABOLIC PANEL (14)   Result Value Ref Range    Glucose 96 70 - 99 mg/dL    Sodium 140 136 - 145 mmol/L    Potassium 4.5 3.5 - 5.1 mmol/L    Chloride 105 98 - 107 mmol/L    CO2 30.0 21. • Essential hypertension    • Hyperlipidemia    • Osteoarthritis      Past Surgical History:   Procedure Laterality Date   • APPENDECTOMY     • BACK SURGERY     • CATARACT     • CHOLECYSTECTOMY     • HIP REPLACEMENT SURGERY Left 4/17/2017   • HYSTERECTOM ALBUTEROL SULFATE  (90 Base) MCG/ACT Inhalation Aero Soln INHALE 2 PUFFS INTO THE LUNGS EVERY 4 HOURS AS NEEDED 25.5 g 5   • Benazepril HCl 40 MG Oral Tab Take 1 tablet (40 mg total) by mouth once daily.  90 tablet 3   • PEG 3350 Oral Powd Pack Take intolerance, polyuria or polydipsia.       EXAM:   BP (!) 180/98 (BP Location: Left arm, Patient Position: Sitting, Cuff Size: adult)   Pulse 104   Temp 98.2 °F (36.8 °C) (Tympanic)   Resp 16   Ht 62\"   Wt 138 lb 9.6 oz (62.9 kg)   BMI 25.35 kg/m²  Estimat disorder [facility])  She does have depression but is unable to take her antidepressants. 3. Tobacco use disorder  She continues to smoke in spite of the caution to not smoke. 4. Panlobular emphysema ([address]  She does have a history of COPD.   It is unchanged Chronic pain syndrome     Myalgia      Laura Morales MD  12/17/2019  5:24 PM

## 2019-12-17 NOTE — PATIENT INSTRUCTIONS
Please call and schedule appointment with Interventional Radiology for consultation. Start Amlodipine 10 mg daily for blood pressure.

## 2019-12-21 ENCOUNTER — TELEPHONE (OUTPATIENT)
Dept: FAMILY MEDICINE CLINIC | Facility: CLINIC | Age: 74
End: 2019-12-21

## 2019-12-21 RX ORDER — TRAMADOL HYDROCHLORIDE 50 MG/1
TABLET ORAL
Qty: 112 TABLET | Refills: 0 | Status: SHIPPED | OUTPATIENT
Start: 2019-12-21 | End: 2019-12-30

## 2019-12-21 NOTE — TELEPHONE ENCOUNTER
2nd call      Says she would be eligible for RF Sunday 12/22     C/b   [phone]     Herbie Duverney, 12/21/19, 9:10 AM

## 2019-12-21 NOTE — TELEPHONE ENCOUNTER
This is an early refill. Please advise a 14 day Rx. Sonia Jordan, 12/21/19, 8:19 AM      Future appt:     Your appointments     Date & Time Appointment Department [facility])    Mar 17, 2020 11:00 AM CDT Follow Up Visit with MD Asia Mark

## 2019-12-21 NOTE — TELEPHONE ENCOUNTER
OLINDA Salvador   ( is two days early ) but. ..   her back is hurting her something fierce      please call her back

## 2019-12-30 RX ORDER — TRAMADOL HYDROCHLORIDE 50 MG/1
TABLET ORAL
Qty: 240 TABLET | Refills: 0 | Status: SHIPPED | OUTPATIENT
Start: 2019-12-30 | End: 2020-01-17

## 2019-12-30 NOTE — TELEPHONE ENCOUNTER
Patient states she briefly discussed short term memory loss with  at time of visit. States she has upcoming appt in March. Will discuss further at March visit as this is becoming more bothersome to her.     Requesting Tramadol Rx to [place]

## 2020-01-03 RX ORDER — BUDESONIDE AND FORMOTEROL FUMARATE DIHYDRATE 160; 4.5 UG/1; UG/1
AEROSOL RESPIRATORY (INHALATION)
Qty: 3 INHALER | Refills: 1 | Status: SHIPPED | OUTPATIENT
Start: 2020-01-03 | End: 2020-07-21

## 2020-01-03 NOTE — TELEPHONE ENCOUNTER
Future appt:     Your appointments     Date & Time Appointment Department [facility])    Mar 17, 2020 11:00 AM CDT Follow Up Visit with Aric Rapp MD [address], Aisha Chavez ([facility])            [address] [facility]

## 2020-01-06 ENCOUNTER — TELEPHONE (OUTPATIENT)
Dept: FAMILY MEDICINE CLINIC | Facility: CLINIC | Age: 75
End: 2020-01-06

## 2020-01-06 NOTE — TELEPHONE ENCOUNTER
Kenneth Corral-  Patient fell 1/4/20. Please note ER Visit in EMR. Family states Patient is full care for them as patient cannot feed/ambulate/care for herself. Family/Patient looking at [address]     Phoned [address] to a Ca

## 2020-01-10 RX ORDER — ALPRAZOLAM 0.5 MG/1
TABLET ORAL
Qty: 90 TABLET | Refills: 0 | Status: SHIPPED | OUTPATIENT
Start: 2020-01-10 | End: 2020-01-27

## 2020-01-10 NOTE — TELEPHONE ENCOUNTER
Ongoing Prescription. Ike [place], 01/10/20, 7:06 AM    Future appt:     Your appointments     Date & Time Appointment Department [facility])    Mar 17, 2020 11:00 AM CDT Follow Up Visit with Errol Solares MD [address] [facility], [facility]

## 2020-01-17 RX ORDER — TRAMADOL HYDROCHLORIDE 50 MG/1
TABLET ORAL
Qty: 240 TABLET | Refills: 0 | Status: SHIPPED | OUTPATIENT
Start: 2020-01-17 | End: 2020-02-13

## 2020-01-17 NOTE — TELEPHONE ENCOUNTER
Future appt:     Your appointments     Date & Time Appointment Department [facility])    Mar 17, 2020 11:00 AM CDT Follow Up Visit with Sonja Law MD [address] [place] [address], [facility])            [facility]

## 2020-01-18 ENCOUNTER — TELEPHONE (OUTPATIENT)
Dept: FAMILY MEDICINE CLINIC | Facility: CLINIC | Age: 75
End: 2020-01-18

## 2020-01-18 RX ORDER — HYDROCODONE BITARTRATE AND ACETAMINOPHEN 5; 325 MG/1; MG/1
1 TABLET ORAL AS DIRECTED
Refills: 0 | COMMUNITY
Start: 2020-01-13 | End: 2020-07-29 | Stop reason: ALTCHOICE

## 2020-01-18 NOTE — TELEPHONE ENCOUNTER
Daughter Oli Breath informed of below. She will relay to Patient.   Ruthie Bañuelos, 01/18/20, 11:37 AM

## 2020-01-18 NOTE — TELEPHONE ENCOUNTER
Patient has really bad pain in left hand, wants to know if she can soak her hand in hot water or cold water to relieve pain?      Would like to speak with Marcial Ruiz   Can be reached at [phone]
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
## 2020-01-27 RX ORDER — ALPRAZOLAM 0.5 MG/1
TABLET ORAL
Qty: 90 TABLET | Refills: 0 | Status: SHIPPED | OUTPATIENT
Start: 2020-01-27 | End: 2020-03-06

## 2020-01-27 NOTE — TELEPHONE ENCOUNTER
Future appt:     Your appointments     Date & Time Appointment Department [facility])    Mar 17, 2020 11:00 AM CDT Follow Up Visit with Komal Webber MD [address] [facility], [facility] [facility]            [address] [place] [address]

## 2020-01-28 RX ORDER — ONDANSETRON 4 MG/1
TABLET, FILM COATED ORAL
Qty: 30 TABLET | Refills: 0 | Status: SHIPPED | OUTPATIENT
Start: 2020-01-28 | End: 2020-02-19

## 2020-01-28 NOTE — TELEPHONE ENCOUNTER
Future appt:     Your appointments     Date & Time Appointment Department [facility])    Mar 17, 2020 11:00 AM CDT Follow Up Visit with Jackie Liz MD [address] [place] [address] Dick Dobbins)            [facility]

## 2020-01-29 ENCOUNTER — TELEPHONE (OUTPATIENT)
Dept: FAMILY MEDICINE CLINIC | Facility: CLINIC | Age: 75
End: 2020-01-29

## 2020-01-29 NOTE — TELEPHONE ENCOUNTER
Per Pharmacist-  Patient received #90 Alprazolam 1/10/2020. Refill not due until 2/10/2020. States patient requesting  to give ok for early refill of Alprazolam.    Pharmacist states patient seems a little desperate for the refill.     Please

## 2020-01-31 ENCOUNTER — TELEPHONE (OUTPATIENT)
Dept: FAMILY MEDICINE CLINIC | Facility: CLINIC | Age: 75
End: 2020-01-31

## 2020-01-31 NOTE — TELEPHONE ENCOUNTER
Negro Castillo requested regarding patient, states patient is sick and wants to speak to [facility]
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
## 2020-01-31 NOTE — TELEPHONE ENCOUNTER
This sounds like a combination of multiple things. It sounds like she is going through nicotine withdrawal.  It sounds like she is also going through narcotic withdrawal.  In addition, she seems to have increasing depression and anxiety.   I do advise edwin

## 2020-02-04 ENCOUNTER — TELEPHONE (OUTPATIENT)
Dept: FAMILY MEDICINE CLINIC | Facility: CLINIC | Age: 75
End: 2020-02-04

## 2020-02-04 NOTE — TELEPHONE ENCOUNTER
Patient is now only a few days from ok to  Refill Alprazolam.    Please advise if ok to refill?   Bonita Kincaid, 02/04/20, 11:33 AM
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
## 2020-02-04 NOTE — TELEPHONE ENCOUNTER
Noted that patient had a alprazolam script for 90 tablets sent to [facility] in [place] on 1/27/20. Called patient back and she states [facility] denied that script because it was too soon for her to fill. Dr. Angela Garcia had advised appointment.  States she

## 2020-02-04 NOTE — TELEPHONE ENCOUNTER
I still recommend appointment before filling prescriptions. I do not recommend authorizing refill of Alprazolam early.

## 2020-02-04 NOTE — TELEPHONE ENCOUNTER
Patient informed of below. Expressed understanding. Phoned [facility]-  Can refill Alprazolam on 2/8/20.   Estephania Kyle, 02/04/20, 12:41 PM
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
## 2020-02-13 NOTE — TELEPHONE ENCOUNTER
Please advise refill of Tramadol 50mg. Last Rx: 1/17/20      Future appt:     Your appointments     Date & Time Appointment Department [facility])    Mar 20, 2020 11:00 AM CDT Medicare Annual Well Visit with Eleazar Dixon MD [place] [address], [facility]

## 2020-02-14 RX ORDER — TRAMADOL HYDROCHLORIDE 50 MG/1
TABLET ORAL
Qty: 240 TABLET | Refills: 0 | Status: SHIPPED | OUTPATIENT
Start: 2020-02-14 | End: 2020-03-11

## 2020-02-15 ENCOUNTER — TELEPHONE (OUTPATIENT)
Dept: FAMILY MEDICINE CLINIC | Facility: CLINIC | Age: 75
End: 2020-02-15

## 2020-02-15 NOTE — TELEPHONE ENCOUNTER
Notified patient that her RX for Tramadol was sent to her pharmacy yesterday 2/14/20. Patient verbalized understanding and has no further questions or concerns.

## 2020-02-19 RX ORDER — ONDANSETRON 4 MG/1
TABLET, FILM COATED ORAL
Qty: 30 TABLET | Refills: 0 | Status: SHIPPED | OUTPATIENT
Start: 2020-02-19 | End: 2020-03-25

## 2020-02-19 NOTE — TELEPHONE ENCOUNTER
Future appt:     Your appointments     Date & Time Appointment Department [facility])    Mar 20, 2020 11:00 AM CDT Medicare Annual Well Visit with Merly Stroud MD [address] [facility], [facility]            [place]

## 2020-03-06 RX ORDER — ALPRAZOLAM 0.5 MG/1
TABLET ORAL
Qty: 90 TABLET | Refills: 1 | Status: SHIPPED | OUTPATIENT
Start: 2020-03-06 | End: 2020-05-01

## 2020-03-06 NOTE — TELEPHONE ENCOUNTER
Future appt:     Your appointments     Date & Time Appointment Department [facility])    Mar 20, 2020 11:00 AM CDT Medicare Annual Well Visit with Rony Ken MD [address] [facility] [place] ([facility]            [place]

## 2020-03-11 ENCOUNTER — TELEPHONE (OUTPATIENT)
Dept: FAMILY MEDICINE CLINIC | Facility: CLINIC | Age: 75
End: 2020-03-11

## 2020-03-11 RX ORDER — TRAMADOL HYDROCHLORIDE 50 MG/1
TABLET ORAL
Qty: 240 TABLET | Refills: 0 | Status: SHIPPED | OUTPATIENT
Start: 2020-03-11 | End: 2020-04-08

## 2020-03-11 NOTE — TELEPHONE ENCOUNTER
Patient states she tripped over her walker wheel last Monday. Dillon Hazy on her right side. No bruising or lumps. Did have a follow up visit with Ortho that same day. States she did not have any pain until last Wednesday.     States the pain persists with

## 2020-03-11 NOTE — TELEPHONE ENCOUNTER
regarding a fall she had last week.  wants to know what she can do with the pain she has from falling

## 2020-03-13 ENCOUNTER — HOSPITAL ENCOUNTER (OUTPATIENT)
Dept: GENERAL RADIOLOGY | Age: 75
Discharge: HOME | End: 2020-03-13
Attending: FAMILY MEDICINE
Payer: MEDICARE

## 2020-03-13 ENCOUNTER — TELEPHONE (OUTPATIENT)
Dept: FAMILY MEDICINE CLINIC | Facility: CLINIC | Age: 75
End: 2020-03-13

## 2020-03-13 ENCOUNTER — OFFICE VISIT (OUTPATIENT)
Dept: FAMILY MEDICINE CLINIC | Facility: CLINIC | Age: 75
End: 2020-03-13
Payer: MEDICARE

## 2020-03-13 VITALS
TEMPERATURE: 98 F | SYSTOLIC BLOOD PRESSURE: 110 MMHG | WEIGHT: 125 LBS | HEIGHT: 62 IN | BODY MASS INDEX: 23 KG/M2 | OXYGEN SATURATION: 94 % | HEART RATE: 99 BPM | DIASTOLIC BLOOD PRESSURE: 60 MMHG | RESPIRATION RATE: 16 BRPM

## 2020-03-13 DIAGNOSIS — J43.1 PANLOBULAR EMPHYSEMA (HCC): ICD-10-CM

## 2020-03-13 DIAGNOSIS — S22.31XD CLOSED FRACTURE OF ONE RIB OF RIGHT SIDE WITH ROUTINE HEALING: ICD-10-CM

## 2020-03-13 DIAGNOSIS — R07.89 CHEST WALL PAIN: ICD-10-CM

## 2020-03-13 DIAGNOSIS — R07.89 CHEST WALL PAIN: Primary | ICD-10-CM

## 2020-03-13 PROCEDURE — 99214 OFFICE O/P EST MOD 30 MIN: CPT | Performed by: FAMILY MEDICINE

## 2020-03-13 PROCEDURE — 71100 X-RAY EXAM RIBS UNI 2 VIEWS: CPT | Performed by: FAMILY MEDICINE

## 2020-03-13 PROCEDURE — 71046 X-RAY EXAM CHEST 2 VIEWS: CPT | Performed by: FAMILY MEDICINE

## 2020-03-14 NOTE — PROGRESS NOTES
[facility] [place]  PROGRESS NOTE  Chief Complaint:   Patient presents with:  Fall      HPI:   This is a 75year old female coming in for evaluation after a fall. She said in the last 10 days she has had at least 2 times that she has fallen.   [facility] HGB 13.8 12.0 - 16.0 g/dL    HCT 41.6 35.0 - 48.0 %    .0 150.0 - 450.0 10(3)uL    MCV 98.1 80.0 - 100.0 fL    MCH 32.5 26.0 - 34.0 pg    MCHC 33.2 31.0 - 37.0 g/dL    RDW 16.5 (H) 11.0 - 15.0 %    RDW-SD 59.3 (H) 35.1 - 46.3 fL    Neutrophil Abs (SEE COMMENTS)    Comment:Visual disturbance - fuzzy eyes  [place]                    Percocet  [Oxycodon*        Comment:Other reaction(s): Nausea  Paxil [Paroxetine]      FATIGUE  Current Meds:  Current Outpatient Medications   Medication Sig Dispense Ref hearing loss, sneezing, congestion, runny nose or sore throat. INTEGUMENTARY:  Denies rashes, itching, skin lesion, or excessive skin dryness.   CARDIOVASCULAR:  Denies chest pain, chest pressure, chest discomfort, palpitations, edema, dyspnea on exertion bruising noted in that area. ABDOMEN:  Soft, nondistended, nontender, bowel sounds normal in all 4 quadrants, no masses, no hepatosplenomegaly. ASSESSMENT AND PLAN:   1. Panlobular emphysema ([address]  She does have emphysema.   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

## 2020-03-25 RX ORDER — ONDANSETRON 4 MG/1
TABLET, FILM COATED ORAL
Qty: 30 TABLET | Refills: 1 | Status: SHIPPED | OUTPATIENT
Start: 2020-03-25 | End: 2020-09-14

## 2020-03-25 RX ORDER — MAGNESIUM OXIDE TAB 400 MG (241.3 MG ELEMENTAL MG) 400 (241.3 MG) MG
TAB ORAL
Qty: 90 TABLET | Refills: 1 | Status: SHIPPED | OUTPATIENT
Start: 2020-03-25

## 2020-03-25 NOTE — TELEPHONE ENCOUNTER
Future appt:     Your appointments     Date & Time Appointment Department [facility])    May 19, 2020  4:00 PM CDT Medicare Annual Well Visit with Nam Justin MD [address] [facility]            [place]

## 2020-04-08 RX ORDER — TRAMADOL HYDROCHLORIDE 50 MG/1
TABLET ORAL
Qty: 240 TABLET | Refills: 0 | Status: SHIPPED | OUTPATIENT
Start: 2020-04-08 | End: 2020-05-08

## 2020-04-08 NOTE — TELEPHONE ENCOUNTER
Future appt:     Your appointments     Date & Time Appointment Department [facility])    May 19, 2020  4:00 PM CDT Medicare Annual Well Visit with Jonny Pickens MD [address] [facility] [place] ([facility]            [place]

## 2020-05-01 RX ORDER — ALPRAZOLAM 0.5 MG/1
TABLET ORAL
Qty: 90 TABLET | Refills: 0 | Status: SHIPPED | OUTPATIENT
Start: 2020-05-01 | End: 2020-06-01

## 2020-05-01 NOTE — TELEPHONE ENCOUNTER
Future appt:     Your appointments     Date & Time Appointment Department [facility])    May 19, 2020  4:00 PM CDT Medicare Annual Well Visit with Marques Rosas MD [address] [facility] [place] ([facility]            [place]

## 2020-05-08 RX ORDER — TRAMADOL HYDROCHLORIDE 50 MG/1
TABLET ORAL
Qty: 240 TABLET | Refills: 0 | Status: SHIPPED | OUTPATIENT
Start: 2020-05-08 | End: 2020-06-08

## 2020-05-08 NOTE — TELEPHONE ENCOUNTER
Future appt:     Your appointments     Date & Time Appointment Department [facility])    May 19, 2020  4:00 PM CDT Medicare Annual Well Visit with Hansel Leventhal, MD [address] [place] [address], [place] ([facility]

## 2020-05-13 ENCOUNTER — TELEPHONE (OUTPATIENT)
Dept: FAMILY MEDICINE CLINIC | Facility: CLINIC | Age: 75
End: 2020-05-13

## 2020-05-13 NOTE — TELEPHONE ENCOUNTER
Pt informed. Call routed to appt desk to get pt scheduled.       Future Appointments   Date Time Provider [facility]   5/18/2020  3:00 PM Belinda Shields MD [facility] [place] [facility] [place]   8/10/2020 11:00 AM Belinda Shields MD [facility] [place] [facility]

## 2020-05-13 NOTE — TELEPHONE ENCOUNTER
Pt contacted  Pt has multiple concerns. Pt c/o of horrible fatigue  Pt states she needs to sit frequently after walking. Pt states after walking 40 feet she needs to sit. Pt states she feels drained. Pt states she has hx: of PAD.   Pt states she noted t

## 2020-05-13 NOTE — TELEPHONE ENCOUNTER
Placed call to patient. Working from home and received a message stating \"The party you are trying to reach does not accept calls from parties with caller ID block. \"  Will route to NADIYA Victor, who is working in the office today.     Kayleigh, could you pleas

## 2020-05-13 NOTE — TELEPHONE ENCOUNTER
Please call Landen Laws. Ask her if it would be possible for her to set up an appointment to come in for an evaluation. If she is unable to come in, we see if we can set up a video visit for her tomorrow or sometime Tuesday next week.

## 2020-06-01 RX ORDER — ALPRAZOLAM 0.5 MG/1
TABLET ORAL
Qty: 90 TABLET | Refills: 0 | Status: SHIPPED | OUTPATIENT
Start: 2020-06-01 | End: 2020-07-01

## 2020-06-01 NOTE — TELEPHONE ENCOUNTER
Future appt:     Your appointments     Date & Time Appointment Department [facility])    Aug 10, 2020 11:00 AM CDT Medicare Annual Well Visit with Belinda Shields MD [address] [place] [address] [place], [place] ([place])            [place]

## 2020-06-08 RX ORDER — TRAMADOL HYDROCHLORIDE 50 MG/1
TABLET ORAL
Qty: 240 TABLET | Refills: 0 | Status: SHIPPED | OUTPATIENT
Start: 2020-06-08 | End: 2020-07-06

## 2020-06-08 NOTE — TELEPHONE ENCOUNTER
Future appt:     Your appointments     Date & Time Appointment Department [facility])    Aug 10, 2020 11:00 AM CDT Medicare Annual Well Visit with Errol Solares MD [address] [facility] [place] ([facility]            [place]

## 2020-06-10 ENCOUNTER — TELEPHONE (OUTPATIENT)
Dept: FAMILY MEDICINE CLINIC | Facility: CLINIC | Age: 75
End: 2020-06-10
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
## 2020-06-10 NOTE — TELEPHONE ENCOUNTER
This headache sounds very frustrating. It is possible that it was related to the previous injury. However, there is not anything new or different that we will be able to do for it with medication. Please continue to use the cool compresses.

## 2020-06-10 NOTE — TELEPHONE ENCOUNTER
Patient states she has been having a daily  Headache x3 weeks in the Left [place] down to nose. States when she applies a cold baby wipe helps the pain. Sometimes takes up to  3 cold baby wipes are washcloths are to heavy.     States when she was 3 years

## 2020-07-01 RX ORDER — ALPRAZOLAM 0.5 MG/1
TABLET ORAL
Qty: 90 TABLET | Refills: 0 | Status: SHIPPED | OUTPATIENT
Start: 2020-07-01 | End: 2020-07-29

## 2020-07-01 RX ORDER — BENAZEPRIL HYDROCHLORIDE 40 MG/1
TABLET, FILM COATED ORAL
Qty: 90 TABLET | Refills: 0 | Status: SHIPPED | OUTPATIENT
Start: 2020-07-01 | End: 2020-10-19

## 2020-07-01 NOTE — TELEPHONE ENCOUNTER
Future appt:     Your appointments     Date & Time Appointment Department [facility])    Aug 10, 2020 11:00 AM CDT Medicare Annual Well Visit with Nanci Stewart MD [address] [facility] [place] ([facility]            [place]

## 2020-07-06 RX ORDER — TRAMADOL HYDROCHLORIDE 50 MG/1
TABLET ORAL
Qty: 240 TABLET | Refills: 0 | Status: SHIPPED | OUTPATIENT
Start: 2020-07-06 | End: 2020-08-03

## 2020-07-06 NOTE — TELEPHONE ENCOUNTER
Future appt:     Your appointments     Date & Time Appointment Department [facility])    Aug 10, 2020 11:00 AM CDT Medicare Annual Well Visit with Cathy Nice MD [address] [facility], [place] ([facility]

## 2020-07-21 RX ORDER — BUDESONIDE AND FORMOTEROL FUMARATE DIHYDRATE 160; 4.5 UG/1; UG/1
AEROSOL RESPIRATORY (INHALATION)
Qty: 3 INHALER | Refills: 1 | Status: SHIPPED | OUTPATIENT
Start: 2020-07-21 | End: 2020-11-02

## 2020-07-21 NOTE — TELEPHONE ENCOUNTER
Future appt:     Your appointments     Date & Time Appointment Department [facility])    Aug 10, 2020 11:00 AM CDT Medicare Annual Well Visit with Trudy Gillette MD [address] [place] [address] [place], [place] ([place])            [place]

## 2020-07-27 ENCOUNTER — TELEPHONE (OUTPATIENT)
Dept: FAMILY MEDICINE CLINIC | Facility: CLINIC | Age: 75
End: 2020-07-27

## 2020-07-27 NOTE — TELEPHONE ENCOUNTER
Patient states the top of her right foot has been numb. States he toes have been curling under. Becoming very uncomfortable. Previous stroke to this side. Would like to see  for evaluation. Appt given.   Maggie Ponce, 07/27/20, 1:47 PM

## 2020-07-29 ENCOUNTER — OFFICE VISIT (OUTPATIENT)
Dept: FAMILY MEDICINE CLINIC | Facility: CLINIC | Age: 75
End: 2020-07-29
Payer: MEDICARE

## 2020-07-29 VITALS
BODY MASS INDEX: 23.55 KG/M2 | DIASTOLIC BLOOD PRESSURE: 74 MMHG | HEART RATE: 108 BPM | HEIGHT: 62 IN | WEIGHT: 128 LBS | OXYGEN SATURATION: 97 % | SYSTOLIC BLOOD PRESSURE: 132 MMHG | TEMPERATURE: 99 F | RESPIRATION RATE: 24 BRPM

## 2020-07-29 DIAGNOSIS — I10 ESSENTIAL HYPERTENSION: ICD-10-CM

## 2020-07-29 DIAGNOSIS — M24.574 CONTRACTURE OF JOINT OF RIGHT FOOT: Primary | ICD-10-CM

## 2020-07-29 DIAGNOSIS — D48.9 NEOPLASM OF UNCERTAIN BEHAVIOR: ICD-10-CM

## 2020-07-29 DIAGNOSIS — I69.351 FLACCID HEMIPLEGIA OF RIGHT DOMINANT SIDE AS LATE EFFECT OF CEREBRAL INFARCTION (HCC): ICD-10-CM

## 2020-07-29 DIAGNOSIS — F33.1 MODERATE EPISODE OF RECURRENT MAJOR DEPRESSIVE DISORDER (HCC): ICD-10-CM

## 2020-07-29 DIAGNOSIS — I73.9 PAD (PERIPHERAL ARTERY DISEASE) (HCC): ICD-10-CM

## 2020-07-29 PROBLEM — C44.212: Status: ACTIVE | Noted: 2020-07-29

## 2020-07-29 PROCEDURE — 99214 OFFICE O/P EST MOD 30 MIN: CPT | Performed by: FAMILY MEDICINE

## 2020-07-29 RX ORDER — ALPRAZOLAM 0.5 MG/1
TABLET ORAL
Qty: 90 TABLET | Refills: 0 | Status: SHIPPED | OUTPATIENT
Start: 2020-07-29 | End: 2020-08-28

## 2020-07-29 NOTE — PATIENT INSTRUCTIONS
See physical therapy for evaluation of Right foot contracture. See Dr Nereida Ocasio for spot on ear.

## 2020-07-29 NOTE — PROGRESS NOTES
[address]  PROGRESS NOTE  Chief Complaint:   Patient presents with: Foot Pain: right foot numb, painful, toes want to stay bent, stroke foot x Sunday night       HPI:   This is a 75year old female coming in for multiple concerns.     [place] Range    TSH 1.030 0.358 - 3.740 mIU/mL   CBC W/ DIFFERENTIAL   Result Value Ref Range    WBC 8.7 4.0 - 11.0 x10(3) uL    RBC 4.24 3.80 - 5.30 x10(6)uL    HGB 13.8 12.0 - 16.0 g/dL    HCT 41.6 35.0 - 48.0 %    .0 150.0 - 450.0 10(3)uL    MCV 98.1 80 [Zo*        Comment:Other reaction(s): Profound weakness; gradual             effect  Lyrica [Pregabalin]     RESTLESSNESS  Buspirone               OTHER (SEE COMMENTS)    Comment:Visual disturbance - fuzzy eyes  [place]                    Percocet  [Oxycod cough or sputum. GASTROINTESTINAL:  Denies abdominal pain, nausea, vomiting, constipation, diarrhea, or blood in stool. MUSCULOSKELETAL: See HPI.   NEUROLOGICAL:  Denies headache, seizures, dizziness, syncope, paralysis, ataxia, numbness or tingling in th previous stroke, she is developing a contracture of the right foot and weakness in the right ankle. Plan: Referral to physical therapy. Consider bracing for the right foot and right ankle.  - PHYSICAL THERAPY EXTERNAL    2.  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

## 2020-08-03 RX ORDER — TRAMADOL HYDROCHLORIDE 50 MG/1
TABLET ORAL
Qty: 240 TABLET | Refills: 0 | Status: SHIPPED | OUTPATIENT
Start: 2020-08-03 | End: 2020-08-31

## 2020-08-03 NOTE — TELEPHONE ENCOUNTER
Future appt:     Your appointments     Date & Time Appointment Department [facility])    Aug 10, 2020 11:00 AM CDT Medicare Annual Well Visit with Krystian Alfred MD [address] [facility] [place] ([facility]            [place]

## 2020-08-28 RX ORDER — ALPRAZOLAM 0.5 MG/1
TABLET ORAL
Qty: 90 TABLET | Refills: 1 | Status: SHIPPED | OUTPATIENT
Start: 2020-08-28 | End: 2020-10-26

## 2020-08-28 NOTE — TELEPHONE ENCOUNTER
Future appt:    Last Appointment with provider:   7/29/2020; No f/u recommended    Last appointment at [facility]:  7/29/2020  Cholesterol, Total (mg/dL)   Date Value   03/19/2019 207 (H)     HDL Cholesterol (mg/dL)   Date Value   03/19/2019 40     LDL C

## 2020-08-31 RX ORDER — TRAMADOL HYDROCHLORIDE 50 MG/1
TABLET ORAL
Qty: 240 TABLET | Refills: 0 | Status: SHIPPED | OUTPATIENT
Start: 2020-08-31 | End: 2020-09-28

## 2020-09-14 RX ORDER — AMLODIPINE BESYLATE 10 MG/1
TABLET ORAL
Qty: 90 TABLET | Refills: 1 | Status: SHIPPED | OUTPATIENT
Start: 2020-09-14 | End: 2021-04-08

## 2020-09-14 RX ORDER — ONDANSETRON 4 MG/1
TABLET, FILM COATED ORAL
Qty: 30 TABLET | Refills: 1 | Status: SHIPPED | OUTPATIENT
Start: 2020-09-14 | End: 2020-12-16

## 2020-09-14 NOTE — TELEPHONE ENCOUNTER
Future appt:     Your appointments     Date & Time Appointment Department [facility])    Sep 25, 2020 10:30 AM CDT Medicare Annual Well Visit with Merly Stroud MD [address] [facility], [place] ([facility]            [place]

## 2020-09-25 ENCOUNTER — OFFICE VISIT (OUTPATIENT)
Dept: FAMILY MEDICINE CLINIC | Facility: CLINIC | Age: 75
End: 2020-09-25
Payer: MEDICARE

## 2020-09-25 ENCOUNTER — APPOINTMENT (OUTPATIENT)
Dept: LAB | Age: 75
End: 2020-09-25
Attending: FAMILY MEDICINE
Payer: MEDICARE

## 2020-09-25 VITALS
SYSTOLIC BLOOD PRESSURE: 158 MMHG | HEIGHT: 62 IN | WEIGHT: 127.63 LBS | RESPIRATION RATE: 16 BRPM | BODY MASS INDEX: 23.49 KG/M2 | OXYGEN SATURATION: 96 % | DIASTOLIC BLOOD PRESSURE: 92 MMHG | HEART RATE: 100 BPM | TEMPERATURE: 98 F

## 2020-09-25 DIAGNOSIS — E78.49 FAMILIAL MULTIPLE LIPOPROTEIN-TYPE HYPERLIPIDEMIA: ICD-10-CM

## 2020-09-25 DIAGNOSIS — F33.1 MODERATE EPISODE OF RECURRENT MAJOR DEPRESSIVE DISORDER (HCC): ICD-10-CM

## 2020-09-25 DIAGNOSIS — G89.4 CHRONIC PAIN SYNDROME: ICD-10-CM

## 2020-09-25 DIAGNOSIS — Z82.49 FAMILY HISTORY OF ISCHEMIC HEART DISEASE: ICD-10-CM

## 2020-09-25 DIAGNOSIS — Z13.6 SCREENING FOR CARDIOVASCULAR CONDITION: ICD-10-CM

## 2020-09-25 DIAGNOSIS — Z28.21 IMMUNIZATION NOT CARRIED OUT BECAUSE OF PATIENT REFUSAL: ICD-10-CM

## 2020-09-25 DIAGNOSIS — Z00.00 ENCOUNTER FOR ANNUAL HEALTH EXAMINATION: Primary | ICD-10-CM

## 2020-09-25 DIAGNOSIS — M81.0 AGE-RELATED OSTEOPOROSIS WITHOUT CURRENT PATHOLOGICAL FRACTURE: ICD-10-CM

## 2020-09-25 DIAGNOSIS — M15.9 PRIMARY OSTEOARTHRITIS INVOLVING MULTIPLE JOINTS: ICD-10-CM

## 2020-09-25 DIAGNOSIS — M24.574 CONTRACTURE OF JOINT OF RIGHT FOOT: ICD-10-CM

## 2020-09-25 DIAGNOSIS — G89.29 CHRONIC BILATERAL LOW BACK PAIN WITHOUT SCIATICA: ICD-10-CM

## 2020-09-25 DIAGNOSIS — M54.50 CHRONIC BILATERAL LOW BACK PAIN WITHOUT SCIATICA: ICD-10-CM

## 2020-09-25 DIAGNOSIS — F17.200 TOBACCO USE DISORDER: ICD-10-CM

## 2020-09-25 DIAGNOSIS — J43.1 PANLOBULAR EMPHYSEMA (HCC): ICD-10-CM

## 2020-09-25 DIAGNOSIS — M25.559 PAIN IN JOINT INVOLVING PELVIC REGION AND THIGH, UNSPECIFIED LATERALITY: ICD-10-CM

## 2020-09-25 DIAGNOSIS — M79.10 MYALGIA: ICD-10-CM

## 2020-09-25 DIAGNOSIS — I10 ESSENTIAL HYPERTENSION: ICD-10-CM

## 2020-09-25 DIAGNOSIS — I69.351 FLACCID HEMIPLEGIA OF RIGHT DOMINANT SIDE AS LATE EFFECT OF CEREBRAL INFARCTION (HCC): ICD-10-CM

## 2020-09-25 DIAGNOSIS — S32.9XXS LATE EFFECT OF PELVIC FRACTURE: ICD-10-CM

## 2020-09-25 DIAGNOSIS — M16.12 PRIMARY OSTEOARTHRITIS OF LEFT HIP: ICD-10-CM

## 2020-09-25 DIAGNOSIS — Z98.890 H/O LAMINECTOMY: ICD-10-CM

## 2020-09-25 DIAGNOSIS — I73.9 PAD (PERIPHERAL ARTERY DISEASE) (HCC): ICD-10-CM

## 2020-09-25 PROBLEM — R11.0 NAUSEA: Status: RESOLVED | Noted: 2017-02-01 | Resolved: 2020-09-25

## 2020-09-25 PROBLEM — J11.1 INFLUENZA: Status: RESOLVED | Noted: 2018-01-30 | Resolved: 2020-09-25

## 2020-09-25 PROBLEM — S22.31XD CLOSED FRACTURE OF ONE RIB OF RIGHT SIDE WITH ROUTINE HEALING: Status: RESOLVED | Noted: 2020-03-13 | Resolved: 2020-09-25

## 2020-09-25 PROBLEM — R07.89 CHEST WALL PAIN: Status: RESOLVED | Noted: 2020-03-13 | Resolved: 2020-09-25

## 2020-09-25 PROBLEM — D48.9 NEOPLASM OF UNCERTAIN BEHAVIOR: Status: RESOLVED | Noted: 2020-07-29 | Resolved: 2020-09-25

## 2020-09-25 LAB
ALBUMIN SERPL-MCNC: 3.6 G/DL (ref 3.4–5)
ALBUMIN/GLOB SERPL: 0.9 {RATIO} (ref 1–2)
ALP LIVER SERPL-CCNC: 128 U/L
ALT SERPL-CCNC: 12 U/L
ANION GAP SERPL CALC-SCNC: 6 MMOL/L (ref 0–18)
AST SERPL-CCNC: 11 U/L (ref 15–37)
BASOPHILS # BLD AUTO: 0.06 X10(3) UL (ref 0–0.2)
BASOPHILS NFR BLD AUTO: 0.5 %
BILIRUB SERPL-MCNC: 0.7 MG/DL (ref 0.1–2)
BUN BLD-MCNC: 11 MG/DL (ref 7–18)
BUN/CREAT SERPL: 15.3 (ref 10–20)
CALCIUM BLD-MCNC: 9.5 MG/DL (ref 8.5–10.1)
CHLORIDE SERPL-SCNC: 102 MMOL/L (ref 98–112)
CHOLEST SMN-MCNC: 210 MG/DL (ref ?–200)
CO2 SERPL-SCNC: 28 MMOL/L (ref 21–32)
CREAT BLD-MCNC: 0.72 MG/DL
DEPRECATED RDW RBC AUTO: 60.2 FL (ref 35.1–46.3)
EOSINOPHIL # BLD AUTO: 0.12 X10(3) UL (ref 0–0.7)
EOSINOPHIL NFR BLD AUTO: 1.1 %
ERYTHROCYTE [DISTWIDTH] IN BLOOD BY AUTOMATED COUNT: 16.1 % (ref 11–15)
GLOBULIN PLAS-MCNC: 4.1 G/DL (ref 2.8–4.4)
GLUCOSE BLD-MCNC: 97 MG/DL (ref 70–99)
HCT VFR BLD AUTO: 43.7 %
HDLC SERPL-MCNC: 46 MG/DL (ref 40–59)
HGB BLD-MCNC: 14.1 G/DL
IMM GRANULOCYTES # BLD AUTO: 0.07 X10(3) UL (ref 0–1)
IMM GRANULOCYTES NFR BLD: 0.6 %
LDLC SERPL CALC-MCNC: 142 MG/DL (ref ?–100)
LYMPHOCYTES # BLD AUTO: 1.71 X10(3) UL (ref 1–4)
LYMPHOCYTES NFR BLD AUTO: 15.6 %
M PROTEIN MFR SERPL ELPH: 7.7 G/DL (ref 6.4–8.2)
MCH RBC QN AUTO: 32.9 PG (ref 26–34)
MCHC RBC AUTO-ENTMCNC: 32.3 G/DL (ref 31–37)
MCV RBC AUTO: 102.1 FL
MONOCYTES # BLD AUTO: 0.88 X10(3) UL (ref 0.1–1)
MONOCYTES NFR BLD AUTO: 8 %
NEUTROPHILS # BLD AUTO: 8.1 X10 (3) UL (ref 1.5–7.7)
NEUTROPHILS # BLD AUTO: 8.1 X10(3) UL (ref 1.5–7.7)
NEUTROPHILS NFR BLD AUTO: 74.2 %
NONHDLC SERPL-MCNC: 164 MG/DL (ref ?–130)
OSMOLALITY SERPL CALC.SUM OF ELEC: 281 MOSM/KG (ref 275–295)
PATIENT FASTING Y/N/NP: YES
PATIENT FASTING Y/N/NP: YES
PLATELET # BLD AUTO: 384 10(3)UL (ref 150–450)
POTASSIUM SERPL-SCNC: 4.2 MMOL/L (ref 3.5–5.1)
RBC # BLD AUTO: 4.28 X10(6)UL
SODIUM SERPL-SCNC: 136 MMOL/L (ref 136–145)
TRIGL SERPL-MCNC: 110 MG/DL (ref 30–149)
TSI SER-ACNC: 1.25 MIU/ML (ref 0.36–3.74)
URATE SERPL-MCNC: 4 MG/DL
VIT D+METAB SERPL-MCNC: 6.1 NG/ML (ref 30–100)
VLDLC SERPL CALC-MCNC: 22 MG/DL (ref 0–30)
WBC # BLD AUTO: 10.9 X10(3) UL (ref 4–11)

## 2020-09-25 PROCEDURE — 82306 VITAMIN D 25 HYDROXY: CPT | Performed by: FAMILY MEDICINE

## 2020-09-25 PROCEDURE — G0439 PPPS, SUBSEQ VISIT: HCPCS | Performed by: FAMILY MEDICINE

## 2020-09-25 PROCEDURE — 80061 LIPID PANEL: CPT | Performed by: FAMILY MEDICINE

## 2020-09-25 PROCEDURE — 99213 OFFICE O/P EST LOW 20 MIN: CPT | Performed by: FAMILY MEDICINE

## 2020-09-25 PROCEDURE — 80053 COMPREHEN METABOLIC PANEL: CPT | Performed by: FAMILY MEDICINE

## 2020-09-25 PROCEDURE — 84443 ASSAY THYROID STIM HORMONE: CPT | Performed by: FAMILY MEDICINE

## 2020-09-25 PROCEDURE — 84550 ASSAY OF BLOOD/URIC ACID: CPT | Performed by: FAMILY MEDICINE

## 2020-09-25 PROCEDURE — 36415 COLL VENOUS BLD VENIPUNCTURE: CPT | Performed by: FAMILY MEDICINE

## 2020-09-25 PROCEDURE — 85025 COMPLETE CBC W/AUTO DIFF WBC: CPT | Performed by: FAMILY MEDICINE

## 2020-09-25 NOTE — PATIENT INSTRUCTIONS
Recommended Websites for Advanced Directives    [employer].no. org/publications/Documents/personal_dec. pdf  An information packet, including necessary form from the [address] website. http://www. [place]state. [place]/public/books/adv

## 2020-09-25 NOTE — PROGRESS NOTES
[address]  PROGRESS NOTE  Chief Complaint:   Patient presents with: Well Adult      HPI:   This is a 59-year-old female coming in for annual Medicare wellness visit. She continues to have chronic pain.   She has pain in her feet and Influenza, Hepatitis B, Tetanus, or Pneumococcal?: No     Functional Ability     Bathing or Showering: Able without help    Toileting: Able without help    Dressing: Need some help    Eating: Able without help    Driving: Cannot do without help    Preparin Correct    Recall \"Tree\": Correct            Results for orders placed or performed in visit on 09/25/20   URIC ACID, SERUM   Result Value Ref Range    Uric Acid 4.0 2.6 - 6.0 mg/dL   LIPID PANEL   Result Value Ref Range    Cholesterol, Total 210 (H) <20 0.10 - 1.00 x10(3) uL    Eosinophil Absolute 0.12 0.00 - 0.70 x10(3) uL    Basophil Absolute 0.06 0.00 - 0.20 x10(3) uL    Immature Granulocyte Absolute 0.07 0.00 - 1.00 x10(3) uL    Neutrophil % 74.2 %    Lymphocyte % 15.6 %    Monocyte % 8.0 %    Eosinop NEEDED FOR NAUSEA.  30 tablet 1   • PROAIR  (90 Base) MCG/ACT Inhalation Aero Soln INHALE 2 PUFFS BY MOUTH EVERY 4 HOURS AS NEEDED 25.5 g 1   • TRAMADOL HCL 50 MG Oral Tab TAKE 2 TABLETS BY MOUTH EVERY 6 HOURS AS NEEDED FOR PAIN 240 tablet 0   • ALPR Denies excessive sweating, cold or heat intolerance, polyuria or polydipsia. ALLERGIES:  Denies allergic response, history of asthma, sneezing, hives, eczema or rhinitis.      EXAM:   BP (!) 158/92 (BP Location: Left arm, Patient Position: Sitting, Cuff Si than lab work today. 2. Moderate episode of recurrent major depressive disorder ([address]  She does have moderate depression. She is taking her depression medicine.   No new treatment recommended now because she has had adverse reactions to multiple antidep side as late effect of cerebral infarction [facility])  She has a history of hemiplegia of the right side due to a previous stroke. That has improved although she still has weakness on the right side. No new treatment recommended now.   - OFFICE/OUTPT VISIT,EST III    16. Late effect of pelvic fracture  She sustained a previous pelvic fracture that has now healed for the most part.   - OFFICE/OUTPT VISIT,EST,TATI III    17. H/O laminectomy  She does have a history of a lumbar laminectomy.  - OFFICE/OUTPT VISIT,EST Low back pain     Tobacco use disorder     Osteoarthrosis     PAD (peripheral artery disease) (HCC)     H/O laminectomy     Osteoarthritis of hip     Chronic pain syndrome     Myalgia     Contracture of joint of right foot     Moderate episode of recurren

## 2020-09-26 ENCOUNTER — TELEPHONE (OUTPATIENT)
Dept: FAMILY MEDICINE CLINIC | Facility: CLINIC | Age: 75
End: 2020-09-26

## 2020-09-26 RX ORDER — GLUCOSAMINE HCL 500 MG
3000 TABLET ORAL DAILY
Qty: 90 TABLET | Refills: 1 | Status: SHIPPED | OUTPATIENT
Start: 2020-09-26 | End: 2020-10-26

## 2020-09-26 RX ORDER — ERGOCALCIFEROL 1.25 MG/1
50000 CAPSULE ORAL WEEKLY
Qty: 12 CAPSULE | Refills: 0 | Status: SHIPPED | OUTPATIENT
Start: 2020-09-26

## 2020-09-26 NOTE — TELEPHONE ENCOUNTER
Vitamin D level is very very low. It is low enough that it may be causing some problems. I recommend starting a vitamin D supplement. I will send in a prescription for it.   Please take 50,000 units of vitamin D once a week for 12 weeks, then start vitam

## 2020-09-28 RX ORDER — TRAMADOL HYDROCHLORIDE 50 MG/1
TABLET ORAL
Qty: 240 TABLET | Refills: 0 | Status: SHIPPED | OUTPATIENT
Start: 2020-09-28 | End: 2020-10-26

## 2020-09-28 NOTE — TELEPHONE ENCOUNTER
Please advise refill of Tramadol 50mg. Last Rx: 8/31/20      Future appt:    Last Appointment with provider:   9/25/2020 -Physical per notes return in 6 months.     Last appointment at [facility]:  9/25/2020  Cholesterol, Total (mg/dL)   Date Value   09/

## 2020-10-19 ENCOUNTER — TELEPHONE (OUTPATIENT)
Dept: FAMILY MEDICINE CLINIC | Facility: CLINIC | Age: 75
End: 2020-10-19

## 2020-10-19 RX ORDER — BENAZEPRIL HYDROCHLORIDE 40 MG/1
TABLET, FILM COATED ORAL
Qty: 90 TABLET | Refills: 1 | Status: SHIPPED | OUTPATIENT
Start: 2020-10-19 | End: 2020-10-20

## 2020-10-19 NOTE — TELEPHONE ENCOUNTER
Pt contacted. Pt states paperwork would have been mailed approx 5 days ago. Pt states she will check in again later in the week.

## 2020-10-19 NOTE — TELEPHONE ENCOUNTER
Future appt:     Last Appointment with provider:   9/25/2020; Return in 6 months (on 3/25/2021).     Last appointment at [facility]:  9/25/2020  Cholesterol, Total (mg/dL)   Date Value   09/25/2020 210 (H)     HDL Cholesterol (mg/dL)   Date Value   09/25/

## 2020-10-19 NOTE — TELEPHONE ENCOUNTER
I do not see that DNR and POA paperwork are in her chart. Attempted to call patient to see when she mailed them? No answer. Will check with Dr. Chi Dove nurse to see if she received this paperwork. Shahab Rivera states these items have not arrived.

## 2020-10-20 RX ORDER — BENAZEPRIL HYDROCHLORIDE 40 MG/1
40 TABLET, FILM COATED ORAL DAILY
Qty: 90 TABLET | Refills: 1 | Status: SHIPPED | OUTPATIENT
Start: 2020-10-20 | End: 2021-05-07

## 2020-10-20 NOTE — TELEPHONE ENCOUNTER
Patient informed DNR/POA Paperwork has not yet been received/agreed.   Letitia Gomes, 10/20/20, 9:41 AM

## 2020-10-20 NOTE — TELEPHONE ENCOUNTER
Future appt:    Last Appointment with provider:   9/25/2020  [facility] appointment at [facility]:  9/25/2020  Cholesterol, Total (mg/dL)   Date Value   09/25/2020 210 (H)     HDL Cholesterol (mg/dL)   Date Value   09/25/2020 46     LDL Cholesterol (mg/dL)   D

## 2020-10-20 NOTE — TELEPHONE ENCOUNTER
----- Message from Davonte Dee sent at 10/20/2020  9:08 AM CDT -----  Needs rx today.  Is out and needs to get someone to  for her today

## 2020-10-26 RX ORDER — TRAMADOL HYDROCHLORIDE 50 MG/1
TABLET ORAL
Qty: 240 TABLET | Refills: 0 | Status: SHIPPED | OUTPATIENT
Start: 2020-10-26 | End: 2020-11-23

## 2020-10-26 RX ORDER — ALPRAZOLAM 0.5 MG/1
TABLET ORAL
Qty: 90 TABLET | Refills: 0 | Status: SHIPPED | OUTPATIENT
Start: 2020-10-26 | End: 2020-11-23

## 2020-11-02 NOTE — TELEPHONE ENCOUNTER
Future appt:    Last Appointment with provider:   9/25/2020  Last appointment at [facility]:  9/25/2020  Cholesterol, Total (mg/dL)   Date Value   09/25/2020 210 (H)     HDL Cholesterol (mg/dL)   Date Value   09/25/2020 46     LDL Cholesterol (mg/dL)   D

## 2020-11-03 RX ORDER — BUDESONIDE AND FORMOTEROL FUMARATE DIHYDRATE 160; 4.5 UG/1; UG/1
AEROSOL RESPIRATORY (INHALATION)
Qty: 3 INHALER | Refills: 0 | Status: SHIPPED | OUTPATIENT
Start: 2020-11-03 | End: 2021-02-13

## 2020-11-09 NOTE — TELEPHONE ENCOUNTER
Patient states her right leg is numb. States from knee to ankle feels like she is wearing knee socks. Wonders if  could give her a prescription for a blood thinner. Informed-No.    Advised to return to [facility]/agrees.

## 2020-11-09 NOTE — TELEPHONE ENCOUNTER
R Leg pain really bad & almost numb to knee. Is having some hot spots    Never got the [address] from [facility] in [place]. Only got 1 box, but not 3 that medicare was billed for.

## 2020-11-23 RX ORDER — TRAMADOL HYDROCHLORIDE 50 MG/1
TABLET ORAL
Qty: 240 TABLET | Refills: 0 | Status: SHIPPED | OUTPATIENT
Start: 2020-11-23 | End: 2020-12-18

## 2020-11-23 RX ORDER — ALPRAZOLAM 0.5 MG/1
TABLET ORAL
Qty: 90 TABLET | Refills: 0 | Status: SHIPPED | OUTPATIENT
Start: 2020-11-23 | End: 2020-12-18

## 2020-11-23 NOTE — TELEPHONE ENCOUNTER
Future appt:    Last Appointment with provider:   Visit date not found  Last appointment at [facility]:  9/25/2020  Cholesterol, Total (mg/dL)   Date Value   09/25/2020 210 (H)     HDL Cholesterol (mg/dL)   Date Value   09/25/2020 46     LDL Cholesterol

## 2020-12-16 ENCOUNTER — TELEPHONE (OUTPATIENT)
Dept: FAMILY MEDICINE CLINIC | Facility: CLINIC | Age: 75
End: 2020-12-16

## 2020-12-16 DIAGNOSIS — M79.604 PAIN IN BOTH LOWER EXTREMITIES: Primary | ICD-10-CM

## 2020-12-16 DIAGNOSIS — M79.605 PAIN IN BOTH LOWER EXTREMITIES: Primary | ICD-10-CM

## 2020-12-16 RX ORDER — ONDANSETRON 4 MG/1
TABLET, FILM COATED ORAL
Qty: 30 TABLET | Refills: 1 | Status: SHIPPED | OUTPATIENT
Start: 2020-12-16 | End: 2021-03-01

## 2020-12-16 NOTE — TELEPHONE ENCOUNTER
Please call Shana; her angiogram showed diffuse vascular disease through the arteries of both legs but not correctable with angioplasty.   The radiologist suggested that her symptoms may actually be coming from nerve compression rather than blockage of t

## 2020-12-16 NOTE — TELEPHONE ENCOUNTER
Recommend referral to [address] [place] [address] and evaluation by their [address].

## 2020-12-16 NOTE — TELEPHONE ENCOUNTER
Pt notified and verbalized understanding. She states that she would like to see a neuro surgeon.  Please advise referral.

## 2020-12-16 NOTE — TELEPHONE ENCOUNTER
Future appt:     Last Appointment with provider:   9/25/2020; Return in 6 months (on 3/25/2021).     Last appointment at [facility]:  9/25/2020  Cholesterol, Total (mg/dL)   Date Value   09/25/2020 210 (H)     HDL Cholesterol (mg/dL)   Date Value   09/25/

## 2020-12-16 NOTE — TELEPHONE ENCOUNTER
Pt notified and verbalized understanding. Pt given contact number to [facility] - [phone]. Referral order faxed to [phone].

## 2020-12-18 ENCOUNTER — TELEPHONE (OUTPATIENT)
Dept: FAMILY MEDICINE CLINIC | Facility: CLINIC | Age: 75
End: 2020-12-18

## 2020-12-18 RX ORDER — ALPRAZOLAM 0.5 MG/1
TABLET ORAL
Qty: 90 TABLET | Refills: 0 | Status: SHIPPED | OUTPATIENT
Start: 2020-12-18 | End: 2021-01-18

## 2020-12-18 RX ORDER — TRAMADOL HYDROCHLORIDE 50 MG/1
TABLET ORAL
Qty: 240 TABLET | Refills: 0 | Status: SHIPPED | OUTPATIENT
Start: 2020-12-18 | End: 2021-01-18

## 2020-12-18 NOTE — TELEPHONE ENCOUNTER
I called [facility] in [facility] and asked them what medical records that they needed and I was told that they are already requesting records from hospitals where pt has had previous surgeries. She stated that they do not need any records from us.      P

## 2020-12-18 NOTE — TELEPHONE ENCOUNTER
Future appt:    Last Appointment with provider:   9/25/2020(Px)-F/U in 6 months  Last appointment at [facility]:  9/25/2020    ALPRAZOLAM 0.5 MG Oral Tab    90tabs  0refills        Filled:11/23/20 Summary: TAKE 1 TABLET BY MOUTH THREE TIMES DAILY AS NEED

## 2021-01-17 DIAGNOSIS — G89.4 CHRONIC PAIN SYNDROME: Primary | ICD-10-CM

## 2021-01-18 RX ORDER — TRAMADOL HYDROCHLORIDE 50 MG/1
TABLET ORAL
Qty: 240 TABLET | Refills: 0 | Status: SHIPPED | OUTPATIENT
Start: 2021-01-18 | End: 2021-02-13

## 2021-01-18 RX ORDER — ALPRAZOLAM 0.5 MG/1
TABLET ORAL
Qty: 90 TABLET | Refills: 0 | Status: SHIPPED | OUTPATIENT
Start: 2021-01-18 | End: 2021-02-13

## 2021-01-18 NOTE — TELEPHONE ENCOUNTER
Chronic Pain/Anxiety    Alprazolam/Tramadol:  12/18/20      Future appt:    Last Appointment with provider:   9/25/2020  Last appointment at [facility]:  9/25/2020  Cholesterol, Total (mg/dL)   Date Value   09/25/2020 210 (H)     HDL Cholesterol (mg/dL)

## 2021-02-01 DIAGNOSIS — Z23 NEED FOR VACCINATION: ICD-10-CM

## 2021-02-13 DIAGNOSIS — J44.9 CHRONIC OBSTRUCTIVE PULMONARY DISEASE, UNSPECIFIED COPD TYPE (HCC): Primary | ICD-10-CM

## 2021-02-13 DIAGNOSIS — G89.4 CHRONIC PAIN SYNDROME: ICD-10-CM

## 2021-02-13 RX ORDER — ALPRAZOLAM 0.5 MG/1
TABLET ORAL
Qty: 90 TABLET | Refills: 0 | Status: SHIPPED | OUTPATIENT
Start: 2021-02-13 | End: 2021-03-17

## 2021-02-13 RX ORDER — TRAMADOL HYDROCHLORIDE 50 MG/1
TABLET ORAL
Qty: 240 TABLET | Refills: 0 | Status: SHIPPED | OUTPATIENT
Start: 2021-02-13 | End: 2021-03-12

## 2021-02-13 NOTE — TELEPHONE ENCOUNTER
Future appt:    Last Appointment with provider:   Visit date not found  Last appointment at [facility]:  9/25/2020(Px)-F/U in 6 months    BUDESONIDE-FORMOTEROL FUMARATE 160-4.5 MCG/ACT Inhalation Aerosol    3inhal  0refill      Filled:11/3/20 Summary: IN

## 2021-02-13 NOTE — TELEPHONE ENCOUNTER
Tramadol:  Alprazolam: 1/18/21    Future appt:    Last Appointment with provider:   Visit date not found  Last appointment at [facility]:  9/25/2020  Cholesterol, Total (mg/dL)   Date Value   09/25/2020 210 (H)     HDL Cholesterol (mg/dL)   Date Value

## 2021-02-15 RX ORDER — BUDESONIDE AND FORMOTEROL FUMARATE DIHYDRATE 160; 4.5 UG/1; UG/1
AEROSOL RESPIRATORY (INHALATION)
Qty: 3 INHALER | Refills: 1 | Status: SHIPPED | OUTPATIENT
Start: 2021-02-15 | End: 2021-05-24

## 2021-02-22 ENCOUNTER — TELEPHONE (OUTPATIENT)
Dept: FAMILY MEDICINE CLINIC | Facility: CLINIC | Age: 76
End: 2021-02-22

## 2021-02-22 NOTE — TELEPHONE ENCOUNTER
Patient states last thurs/friday she started not feeling well. Saturday afternoon a friend brought over chili. Patient had a few bites, felt full. States from 6-730 she had vomiting/diarrhea/felt hot/slight headache/weak.     States after 7:30 all sx

## 2021-02-22 NOTE — TELEPHONE ENCOUNTER
Agreed. Unfortunately, an antibiotic would probably make the symptoms worse. No new treatment recommended now.

## 2021-03-01 RX ORDER — ONDANSETRON 4 MG/1
TABLET, FILM COATED ORAL
Qty: 30 TABLET | Refills: 1 | Status: SHIPPED | OUTPATIENT
Start: 2021-03-01 | End: 2021-07-01

## 2021-03-01 NOTE — TELEPHONE ENCOUNTER
Ondansetron: 12/16/20    Future appt:    Last Appointment with provider:   9/25/2020  Last appointment at [facility]:  9/25/2020  Cholesterol, Total (mg/dL)   Date Value   09/25/2020 210 (H)     HDL Cholesterol (mg/dL)   Date Value   09/25/2020 46     LD

## 2021-03-12 ENCOUNTER — TELEPHONE (OUTPATIENT)
Dept: FAMILY MEDICINE CLINIC | Facility: CLINIC | Age: 76
End: 2021-03-12

## 2021-03-12 DIAGNOSIS — G89.4 CHRONIC PAIN SYNDROME: ICD-10-CM

## 2021-03-12 RX ORDER — TRAMADOL HYDROCHLORIDE 50 MG/1
TABLET ORAL
Qty: 240 TABLET | Refills: 0 | Status: SHIPPED | OUTPATIENT
Start: 2021-03-12 | End: 2021-04-09

## 2021-03-12 NOTE — TELEPHONE ENCOUNTER
wants to transfer RX for tramadahl to [facility] in [place]   instead of [facility] in [facility]    ( they will not release new RX until 3/15 )      call to confirm ASAP       Future Appointments   Date Time Provider Aroldo Lopez   3/17/2021 11:00 AM T

## 2021-03-12 NOTE — TELEPHONE ENCOUNTER
Future appt:     Your appointments     Date & Time Appointment Department [facility])    Apr 05, 2021  2:00 PM CDT Exam - Established with Coral Issa MD [address] [place] [address]            Milady Cheema

## 2021-03-12 NOTE — TELEPHONE ENCOUNTER
Patient states [facility] will not fill her Tramadol until 3/15. Patient states she picked it up on 2/13. Phoned [facility]-  They state patient did not  her tramadol until 2/15 and refill not due until 3/15.     Asked that pharmacist go ahead and

## 2021-03-17 ENCOUNTER — OFFICE VISIT (OUTPATIENT)
Dept: FAMILY MEDICINE CLINIC | Facility: CLINIC | Age: 76
End: 2021-03-17
Payer: MEDICARE

## 2021-03-17 VITALS
SYSTOLIC BLOOD PRESSURE: 130 MMHG | BODY MASS INDEX: 23.37 KG/M2 | WEIGHT: 127 LBS | OXYGEN SATURATION: 96 % | DIASTOLIC BLOOD PRESSURE: 80 MMHG | TEMPERATURE: 98 F | HEART RATE: 104 BPM | RESPIRATION RATE: 18 BRPM | HEIGHT: 62 IN

## 2021-03-17 DIAGNOSIS — J43.1 PANLOBULAR EMPHYSEMA (HCC): Primary | ICD-10-CM

## 2021-03-17 DIAGNOSIS — I73.9 PAD (PERIPHERAL ARTERY DISEASE) (HCC): ICD-10-CM

## 2021-03-17 DIAGNOSIS — M15.9 PRIMARY OSTEOARTHRITIS INVOLVING MULTIPLE JOINTS: ICD-10-CM

## 2021-03-17 DIAGNOSIS — F33.1 MODERATE EPISODE OF RECURRENT MAJOR DEPRESSIVE DISORDER (HCC): ICD-10-CM

## 2021-03-17 DIAGNOSIS — M81.0 AGE-RELATED OSTEOPOROSIS WITHOUT CURRENT PATHOLOGICAL FRACTURE: ICD-10-CM

## 2021-03-17 DIAGNOSIS — I69.351 FLACCID HEMIPLEGIA OF RIGHT DOMINANT SIDE AS LATE EFFECT OF CEREBRAL INFARCTION (HCC): ICD-10-CM

## 2021-03-17 DIAGNOSIS — G89.4 CHRONIC PAIN SYNDROME: ICD-10-CM

## 2021-03-17 DIAGNOSIS — F17.200 TOBACCO USE DISORDER: ICD-10-CM

## 2021-03-17 PROCEDURE — 99214 OFFICE O/P EST MOD 30 MIN: CPT | Performed by: FAMILY MEDICINE

## 2021-03-17 RX ORDER — ALPRAZOLAM 0.5 MG/1
0.5 TABLET ORAL 3 TIMES DAILY PRN
Qty: 90 TABLET | Refills: 0 | Status: SHIPPED | OUTPATIENT
Start: 2021-03-17 | End: 2021-04-15

## 2021-03-17 NOTE — PROGRESS NOTES
[facility] [place]  PROGRESS NOTE  Chief Complaint:   Patient presents with:  Arthritis: getting worse  Refill Request: Xanax       HPI:   This is a 67year old female coming in for follow-up.     She reports that the pain in her left shoulder ge 3.5 - 5.1 mmol/L    Chloride 102 98 - 112 mmol/L    CO2 28.0 21.0 - 32.0 mmol/L    Anion Gap 6 0 - 18 mmol/L    BUN 11 7 - 18 mg/dL    Creatinine 0.72 0.55 - 1.02 mg/dL    BUN/CREA Ratio 15.3 10.0 - 20.0    Calcium, Total 9.5 8.5 - 10.1 mg/dL    Calculated Osteoarthritis      Past Surgical History:   Procedure Laterality Date   • APPENDECTOMY     • BACK SURGERY     • CATARACT     • CHOLECYSTECTOMY     • HIP REPLACEMENT SURGERY Left 4/17/2017   • HYSTERECTOMY     • TUBAL LIGATION       Social History:  Social 1 capsule (50,000 Units total) by mouth once a week.  12 capsule 0   • AMLODIPINE BESYLATE 10 MG Oral Tab TAKE 1 TABLET BY MOUTH DAILY 90 tablet 1   • MAGNESIUM-OXIDE 400 (241.3 Mg) MG Oral Tab TAKE 1 TABLET BY MOUTH DAILY 90 tablet 1   • ibuprofen 200 MG O 97.5 °F (36.4 °C) (Temporal)   Resp 18   Ht 5' 2\" (1.575 m)   Wt 127 lb (57.6 kg)   SpO2 96%   BMI 23.23 kg/m²  Estimated body mass index is 23.23 kg/m² as calculated from the following:    Height as of this encounter: 5' 2\" (1.575 m).     Weight as of th Unfortunately, she has had reactions to almost all of the antidepressants that we have tried. Plan: Refill the Xanax for her now. - ALPRAZolam 0.5 MG Oral Tab; Take 1 tablet (0.5 mg total) by mouth 3 (three) times daily as needed for Anxiety.   Dispense: to learning. Medical education done. Outcome: Patient verbalizes understanding. Patient is notified to call with any questions, complications, allergies, or worsening or changing symptoms.   Patient is to call with any side effects or complications from t

## 2021-03-17 NOTE — PATIENT INSTRUCTIONS
There is arthritis in the left shoulder as well as a partial tear of the left biceps muscle. Recommend PT.

## 2021-04-08 ENCOUNTER — TELEPHONE (OUTPATIENT)
Dept: FAMILY MEDICINE CLINIC | Facility: CLINIC | Age: 76
End: 2021-04-08

## 2021-04-08 DIAGNOSIS — G89.4 CHRONIC PAIN SYNDROME: ICD-10-CM

## 2021-04-08 NOTE — TELEPHONE ENCOUNTER
Future appt:     Last Appointment with provider:   3/17/2021; Return in about 6 months (around 9/17/2021).     Last appointment at [facility]:  3/17/2021  Cholesterol, Total (mg/dL)   Date Value   09/25/2020 210 (H)     HDL Cholesterol (mg/dL)   Date Valu

## 2021-04-09 ENCOUNTER — TELEPHONE (OUTPATIENT)
Dept: FAMILY MEDICINE CLINIC | Facility: CLINIC | Age: 76
End: 2021-04-09

## 2021-04-09 RX ORDER — TRAMADOL HYDROCHLORIDE 50 MG/1
100 TABLET ORAL EVERY 6 HOURS PRN
Qty: 240 TABLET | Refills: 0 | Status: SHIPPED | OUTPATIENT
Start: 2021-04-09 | End: 2021-05-07

## 2021-04-09 RX ORDER — AMLODIPINE BESYLATE 10 MG/1
10 TABLET ORAL DAILY
Qty: 90 TABLET | Refills: 1 | Status: SHIPPED | OUTPATIENT
Start: 2021-04-09

## 2021-04-09 NOTE — TELEPHONE ENCOUNTER
Called to [facility]. Was informed Florencio Bernheim is on lunch right now. I was asked to c/b in about 30 mins.

## 2021-04-09 NOTE — TELEPHONE ENCOUNTER
She has had this prescription filled multiple times in the past.  We sent it in March 12, 2021. Please fill the prescription the way it was ordered.

## 2021-04-09 NOTE — TELEPHONE ENCOUNTER
Spoke with MATTHIAS Ceresadelaide with [facility].   States they received the prescription for patient's Tramadol but they are not seeing that patient has ever had this filled in [place] before when looking at the prescription monitoring program.  MATTHIAS [employer] Heraldotiadelaide [facility]

## 2021-04-15 DIAGNOSIS — G89.4 CHRONIC PAIN SYNDROME: ICD-10-CM

## 2021-04-15 NOTE — TELEPHONE ENCOUNTER
Future appt:     Last Appointment with provider:   3/17/2021; Return in about 6 months (around 9/17/2021).     Last appointment at [facility] [place]:  3/17/2021  Cholesterol, Total (mg/dL)   Date Value   09/25/2020 210 (H)     HDL Cholesterol (mg/dL)   Date Valu

## 2021-04-16 RX ORDER — ALPRAZOLAM 0.5 MG/1
TABLET ORAL
Qty: 90 TABLET | Refills: 1 | Status: SHIPPED | OUTPATIENT
Start: 2021-04-16 | End: 2021-06-15

## 2021-04-21 ENCOUNTER — TELEPHONE (OUTPATIENT)
Dept: FAMILY MEDICINE CLINIC | Facility: CLINIC | Age: 76
End: 2021-04-21

## 2021-04-21 NOTE — TELEPHONE ENCOUNTER
Patient states her Left Shoulder Pain has increased. Patient is requesting an XRay. Appt given with Taylor Anderson on Friday for evaluation/xray. Danna Escobar, 04/21/21, 2:53 PM    Future appt:     Your appointments     Date & Time Appointment Department [phone]

## 2021-04-26 ENCOUNTER — TELEPHONE (OUTPATIENT)
Dept: FAMILY MEDICINE CLINIC | Facility: CLINIC | Age: 76
End: 2021-04-26

## 2021-04-26 ENCOUNTER — HOSPITAL ENCOUNTER (OUTPATIENT)
Dept: GENERAL RADIOLOGY | Age: 76
Discharge: HOME | End: 2021-04-26
Attending: NURSE PRACTITIONER
Payer: MEDICARE

## 2021-04-26 ENCOUNTER — OFFICE VISIT (OUTPATIENT)
Dept: FAMILY MEDICINE CLINIC | Facility: CLINIC | Age: 76
End: 2021-04-26
Payer: MEDICARE

## 2021-04-26 VITALS
TEMPERATURE: 98 F | BODY MASS INDEX: 23.19 KG/M2 | HEIGHT: 62 IN | WEIGHT: 126 LBS | OXYGEN SATURATION: 96 % | HEART RATE: 106 BPM | RESPIRATION RATE: 20 BRPM | DIASTOLIC BLOOD PRESSURE: 84 MMHG | SYSTOLIC BLOOD PRESSURE: 136 MMHG

## 2021-04-26 DIAGNOSIS — M79.622 LEFT UPPER ARM PAIN: ICD-10-CM

## 2021-04-26 DIAGNOSIS — G89.29 CHRONIC LEFT SHOULDER PAIN: ICD-10-CM

## 2021-04-26 DIAGNOSIS — M25.512 CHRONIC LEFT SHOULDER PAIN: Primary | ICD-10-CM

## 2021-04-26 DIAGNOSIS — M25.512 CHRONIC LEFT SHOULDER PAIN: ICD-10-CM

## 2021-04-26 DIAGNOSIS — G89.29 CHRONIC LEFT SHOULDER PAIN: Primary | ICD-10-CM

## 2021-04-26 PROCEDURE — 73060 X-RAY EXAM OF HUMERUS: CPT | Performed by: NURSE PRACTITIONER

## 2021-04-26 PROCEDURE — 99214 OFFICE O/P EST MOD 30 MIN: CPT | Performed by: NURSE PRACTITIONER

## 2021-04-26 PROCEDURE — 73030 X-RAY EXAM OF SHOULDER: CPT | Performed by: NURSE PRACTITIONER

## 2021-04-26 NOTE — PROGRESS NOTES
[facility] [place]  PROGRESS NOTE  Chief Complaint:   Patient presents with:  Arm Pain  Shoulder Pain      HPI:   This is a 67year old female coming in for left shoulder/upper arm pain. Patient with left shoulder and upper arm pain.   Has a p Percocet  [Oxycodon*        Comment:Other reaction(s): Nausea  Paxil [Paroxetine]      FATIGUE  Current Meds:  Current Outpatient Medications   Medication Sig Dispense Refill   • ALPRAZOLAM 0.5 MG Oral Tab TAKE 1 TABLET BY MOUTH THREE TIME tingling in the extremities,change in bowel or bladder control.     EXAM:   /84 (BP Location: Right arm, Patient Position: Sitting, Cuff Size: adult)   Pulse 106   Temp 98.1 °F (36.7 °C)   Resp 20   Ht 5' 2\" (1.575 m)   Wt 126 lb (57.2 kg)   SpO2 96% riding shoulder with superior position of the humerus, diminished space between the surface of the acromion and humeral head. Not acute fracture involving the proximal left humerus with impaction and irregular calcifications.   Ortho referral as noted abov this note.

## 2021-04-26 NOTE — TELEPHONE ENCOUNTER
Patient informed of the below results and recommendations. Contact information given to patient to schedule an appt.

## 2021-04-26 NOTE — TELEPHONE ENCOUNTER
Spoke with [facility] [address] ([phone]). Ortho providers would be either Dr. Talia Ruiz or Dr. Zane Altamirano.

## 2021-04-26 NOTE — TELEPHONE ENCOUNTER
----- Message from ALBERT Maguire sent at 4/26/2021  2:39 PM CDT -----  Patient's xrays showing severe narrowing and bone on bone appearance with severe arthritis of the left shoulder and not acute fracture (likely from fracture last year) where th

## 2021-04-27 ENCOUNTER — TELEPHONE (OUTPATIENT)
Dept: FAMILY MEDICINE CLINIC | Facility: CLINIC | Age: 76
End: 2021-04-27

## 2021-04-27 NOTE — TELEPHONE ENCOUNTER
Pt states that she was referred to an orthopedic yesterday, states that they would like office notes from patients appt yesterday.  Please fax to [phone]

## 2021-04-29 PROBLEM — M79.622 LEFT UPPER ARM PAIN: Status: ACTIVE | Noted: 2021-04-29

## 2021-04-29 PROBLEM — G89.29 CHRONIC LEFT SHOULDER PAIN: Status: ACTIVE | Noted: 2021-04-29

## 2021-04-29 PROBLEM — M25.512 CHRONIC LEFT SHOULDER PAIN: Status: ACTIVE | Noted: 2021-04-29

## 2021-05-06 ENCOUNTER — TELEPHONE (OUTPATIENT)
Dept: FAMILY MEDICINE CLINIC | Facility: CLINIC | Age: 76
End: 2021-05-06

## 2021-05-06 DIAGNOSIS — G89.4 CHRONIC PAIN SYNDROME: ICD-10-CM

## 2021-05-06 NOTE — TELEPHONE ENCOUNTER
Future appt:     Last Appointment with provider:   3/17/2021; Return in about 6 months (around 9/17/2021).     [facility]:  4/26/2021  Cholesterol, Total (mg/dL)   Date Value   09/25/2020 210 (H)     HDL Cholesterol (mg/dL)   Date Valu

## 2021-05-06 NOTE — TELEPHONE ENCOUNTER
Future appt:     Last Appointment with provider:   3/17/2021; Return in about 6 months (around 9/17/2021).     Last appointment at [facility]:  4/26/2021  Cholesterol, Total (mg/dL)   Date Value   09/25/2020 210 (H)     HDL Cholesterol (mg/dL)   Date Valu

## 2021-05-07 ENCOUNTER — TELEPHONE (OUTPATIENT)
Dept: FAMILY MEDICINE CLINIC | Facility: CLINIC | Age: 76
End: 2021-05-07

## 2021-05-07 DIAGNOSIS — G89.4 CHRONIC PAIN SYNDROME: ICD-10-CM

## 2021-05-07 RX ORDER — BENAZEPRIL HYDROCHLORIDE 40 MG/1
40 TABLET, FILM COATED ORAL DAILY
Qty: 90 TABLET | Refills: 1 | Status: SHIPPED | OUTPATIENT
Start: 2021-05-07 | End: 2022-01-19

## 2021-05-07 RX ORDER — TRAMADOL HYDROCHLORIDE 50 MG/1
100 TABLET ORAL EVERY 6 HOURS PRN
Qty: 240 TABLET | Refills: 0 | Status: SHIPPED | OUTPATIENT
Start: 2021-05-07 | End: 2021-06-04

## 2021-05-07 RX ORDER — TRAMADOL HYDROCHLORIDE 50 MG/1
TABLET ORAL
Qty: 240 TABLET | Refills: 0 | Status: SHIPPED | OUTPATIENT
Start: 2021-05-07 | End: 2021-05-07

## 2021-05-07 NOTE — TELEPHONE ENCOUNTER
Patient states her L shoulder is still very painful. States the Cortisone injection given to her a week ago by [facility] [address] only helped for a couple days. States the pain was very bad last night that it kept her awake.   Patient asking if Dr. Oralia camacho

## 2021-05-07 NOTE — TELEPHONE ENCOUNTER
Phone call from the answering service. They reported that Shana called because the pharmacy was not releasing the prescription of tramadol.   We sent an updated prescription for tramadol to the pharmacy at 4:45 PM.  She said she was talking to them ana

## 2021-05-07 NOTE — TELEPHONE ENCOUNTER
Patient states she knows that Tramadol comes in higher dosing. Questions if her Tramadol can dose can increased to 75mg or 100mg? States her shoulder is bone/bone. Is using Lidocaine patches/OTC products. Please advise.   Kyle Wilson, 05/07/21, 2:

## 2021-05-07 NOTE — TELEPHONE ENCOUNTER
It is very frustrating that the pain in the shoulder is getting worse. However, the answer is not to increase the dose of the Tramadol. The absolute upper limit of a safe daily dose is 8 per day. We cannot increase it beyond this point.     Please contin

## 2021-05-07 NOTE — TELEPHONE ENCOUNTER
I resent the prescription for the tramadol. Generic tramadol comes only and 50 mg tablets. There are brand name more expensive forms of tramadol that come in higher doses. Even in those higher doses, the maximum daily dose is 400 mg a day.   Please do

## 2021-05-12 RX ORDER — BENAZEPRIL HYDROCHLORIDE 40 MG/1
TABLET, FILM COATED ORAL
Qty: 90 TABLET | Refills: 1 | OUTPATIENT
Start: 2021-05-12

## 2021-05-12 NOTE — TELEPHONE ENCOUNTER
Future appt:    Last Appointment with provider:   Visit date not found  Last appointment at [facility]:  4/26/2021  Last px: 9/25/20    Benazepril refilled on 5/7/21 for #90, 1 refill to [facility]  Current request from Jana Olson.     Ca

## 2021-05-24 DIAGNOSIS — J44.9 CHRONIC OBSTRUCTIVE PULMONARY DISEASE, UNSPECIFIED COPD TYPE (HCC): ICD-10-CM

## 2021-05-24 RX ORDER — BUDESONIDE AND FORMOTEROL FUMARATE DIHYDRATE 160; 4.5 UG/1; UG/1
2 AEROSOL RESPIRATORY (INHALATION) 2 TIMES DAILY
Qty: 3 INHALER | Refills: 1 | Status: SHIPPED | OUTPATIENT
Start: 2021-05-24 | End: 2021-10-21

## 2021-05-24 NOTE — TELEPHONE ENCOUNTER
Patient states she saw 's PA at   [facility] for her shoulder being   Bone/Bone. States she has had 1 Cortisone Injection. Will be having 2nd Cortisone injection soon. States she got 2 [place] 10/325 from a friend.     Took only 1 in the mor

## 2021-05-24 NOTE — TELEPHONE ENCOUNTER
needs refill for symbicort and pro-air- [facility]-   pt also wants to talk to italia about her arthritis pain - friend gave her norco to help with pain and pt did well with it, is asking for a rx to take just one in the morning

## 2021-06-04 DIAGNOSIS — G89.4 CHRONIC PAIN SYNDROME: ICD-10-CM

## 2021-06-04 RX ORDER — TRAMADOL HYDROCHLORIDE 50 MG/1
TABLET ORAL
Qty: 240 TABLET | Refills: 0 | Status: SHIPPED | OUTPATIENT
Start: 2021-06-04 | End: 2021-07-02

## 2021-06-04 NOTE — TELEPHONE ENCOUNTER
Future appt:     Last Appointment with provider:   3/17/2021; Return in about 6 months (around 9/17/2021).     Last appointment at [facility]:  4/26/2021  Cholesterol, Total (mg/dL)   Date Value   09/25/2020 210 (H)     HDL Cholesterol (mg/dL)   Date Valu

## 2021-06-04 NOTE — TELEPHONE ENCOUNTER
Il  reviewed.   Last filled 05/08/21 for 240 tablets  Okay for refill, not to be filled before 06/07/21, notation made on prescription

## 2021-06-06 DIAGNOSIS — G89.4 CHRONIC PAIN SYNDROME: ICD-10-CM

## 2021-06-07 DIAGNOSIS — G89.4 CHRONIC PAIN SYNDROME: ICD-10-CM

## 2021-06-07 RX ORDER — TRAMADOL HYDROCHLORIDE 50 MG/1
TABLET ORAL
Qty: 240 TABLET | Refills: 0 | OUTPATIENT
Start: 2021-06-07

## 2021-06-08 NOTE — TELEPHONE ENCOUNTER
Phoned [phone]. Informed Rx sent Friday 6/4/21. Pharmacy shows refill. Will cancel these requests.   Chinyere Diaz, 06/07/21, 2:34 PM Attending Attestation (For Attendings USE Only)...

## 2021-06-15 DIAGNOSIS — G89.4 CHRONIC PAIN SYNDROME: ICD-10-CM

## 2021-06-15 RX ORDER — ALPRAZOLAM 0.5 MG/1
TABLET ORAL
Qty: 90 TABLET | Refills: 0 | Status: SHIPPED | OUTPATIENT
Start: 2021-06-15 | End: 2021-07-16

## 2021-06-15 NOTE — TELEPHONE ENCOUNTER
Alprazolam:  4/16/21    Future appt:    Last Appointment with provider:   3/17/2021  [place] [facility]:  4/26/2021  Cholesterol, Total (mg/dL)   Date Value   09/25/2020 210 (H)     HDL Cholesterol (mg/dL)   Date Value   09/25/2020 46     LDL

## 2021-07-01 ENCOUNTER — TELEPHONE (OUTPATIENT)
Dept: FAMILY MEDICINE CLINIC | Facility: CLINIC | Age: 76
End: 2021-07-01

## 2021-07-01 DIAGNOSIS — R11.0 NAUSEA: Primary | ICD-10-CM

## 2021-07-01 RX ORDER — ONDANSETRON 4 MG/1
4 TABLET, FILM COATED ORAL EVERY 8 HOURS PRN
Qty: 30 TABLET | Refills: 0 | Status: SHIPPED | OUTPATIENT
Start: 2021-07-01 | End: 2021-08-11

## 2021-07-01 NOTE — TELEPHONE ENCOUNTER
Future appt:     Last Appointment with provider:   3/17/2021; Return in about 6 months (around 9/17/2021).     Last appointment at [facility]:  4/26/2021  Cholesterol, Total (mg/dL)   Date Value   09/25/2020 210 (H)     HDL Cholesterol (mg/dL)   Date Valu

## 2021-07-01 NOTE — TELEPHONE ENCOUNTER
Would like a refill of zofran sent to [facility] in [place]. Pt states she used it in the past and it helped with nausea. Pt c/o mild nausea in the morning and after she eats occassionally. Offered appt but pt states the nausea is not that bad.

## 2021-07-02 ENCOUNTER — TELEPHONE (OUTPATIENT)
Dept: FAMILY MEDICINE CLINIC | Facility: CLINIC | Age: 76
End: 2021-07-02

## 2021-07-02 DIAGNOSIS — G89.4 CHRONIC PAIN SYNDROME: ICD-10-CM

## 2021-07-02 RX ORDER — TRAMADOL HYDROCHLORIDE 50 MG/1
TABLET ORAL
Qty: 240 TABLET | Refills: 0 | Status: SHIPPED | OUTPATIENT
Start: 2021-07-02 | End: 2021-08-04

## 2021-07-02 NOTE — TELEPHONE ENCOUNTER
Patient states she is due for a Tramadol refill on Sunday. Knows the office is closed until Tuesday. States she just needs it on file to  Sunday. States she did go to Ortho for her shoulder.   She was given an injection and states her shoulder

## 2021-07-02 NOTE — TELEPHONE ENCOUNTER
Future appt:    Last Appointment with provider:   4/26/2021; No f/u recommended    [place] [facility] [place]  4/26/2021  Cholesterol, Total (mg/dL)   Date Value   09/25/2020 210 (H)     HDL Cholesterol (mg/dL)   Date Value   09/25/2020 46     LDL C

## 2021-07-02 NOTE — TELEPHONE ENCOUNTER
Zofran did not go through electronic. Needed prior authorization--No prior auth to be done. Verbal given to pharmacist to fill Rx.

## 2021-07-02 NOTE — TELEPHONE ENCOUNTER
Pt called to check status of script. Informed pt that script was sent. Confirmed [facility] pharmacy in [place]. Pt called pharmacy and they stated they do not have it.

## 2021-07-02 NOTE — TELEPHONE ENCOUNTER
IL  reviewed, last refill dispensed 06/07/21. Will authorize refill but notation made to the pharmacist not to be filled by pharmacy sooner than 7/7/21 which is next Wednesday as this will be the 30 day gallito since last filled.

## 2021-07-15 DIAGNOSIS — G89.4 CHRONIC PAIN SYNDROME: ICD-10-CM

## 2021-07-15 NOTE — TELEPHONE ENCOUNTER
Future appt:     Last Appointment with provider:   3/17/2021- advised Return in about 6 months (around 9/17/2021).     Last refill: 6/15/21- alprazolam 0.5mg  #90  0 refills      Cholesterol, Total (mg/dL)   Date Value   09/25/2020 210 (H)     HDL Cholester

## 2021-07-16 RX ORDER — ALPRAZOLAM 0.5 MG/1
TABLET ORAL
Qty: 90 TABLET | Refills: 0 | Status: SHIPPED | OUTPATIENT
Start: 2021-07-16 | End: 2021-08-16

## 2021-07-18 NOTE — TELEPHONE ENCOUNTER
Future appt:    Last Appointment:  10/6/2017  No results found for: CHOLEST, HDL, LDL, TRIGLY, TRIG  No results found for: EAG, A1C  No results found for: T4F, TSH, TSHT4    No Follow-up on file.
norco refill . could it be ready by 2.
show

## 2021-08-04 DIAGNOSIS — G89.4 CHRONIC PAIN SYNDROME: ICD-10-CM

## 2021-08-04 RX ORDER — TRAMADOL HYDROCHLORIDE 50 MG/1
TABLET ORAL
Qty: 240 TABLET | Refills: 0 | Status: SHIPPED | OUTPATIENT
Start: 2021-08-04 | End: 2021-09-01

## 2021-08-04 NOTE — TELEPHONE ENCOUNTER
Last refill given when PCP was out of office. Cannot get IL  to upload but prior notation about not able to fill until 7/7/21.

## 2021-08-04 NOTE — TELEPHONE ENCOUNTER
Future appt:    Last Appointment with provider:   4/26/2021  Last appointment at [facility] [place]:  4/26/2021  Cholesterol, Total (mg/dL)   Date Value   09/25/2020 210 (H)     HDL Cholesterol (mg/dL)   Date Value   09/25/2020 46     LDL Cholesterol (mg/dL)   D

## 2021-08-11 DIAGNOSIS — R11.0 NAUSEA: ICD-10-CM

## 2021-08-11 RX ORDER — ONDANSETRON 4 MG/1
TABLET, FILM COATED ORAL
Qty: 30 TABLET | Refills: 0 | Status: SHIPPED | OUTPATIENT
Start: 2021-08-11 | End: 2021-10-16

## 2021-08-11 NOTE — TELEPHONE ENCOUNTER
Zofran: 7/1/21    Future appt:     Your appointments     Date & Time Appointment Department [facility])    Oct 04, 2021 11:00 AM CDT Medicare Annual Well Visit with Coral Issa MD [address] [place] [address], Nila Boyer ([facility] Sue Poisson

## 2021-08-15 DIAGNOSIS — G89.4 CHRONIC PAIN SYNDROME: ICD-10-CM

## 2021-08-16 RX ORDER — ALPRAZOLAM 0.5 MG/1
TABLET ORAL
Qty: 90 TABLET | Refills: 0 | Status: SHIPPED | OUTPATIENT
Start: 2021-08-16 | End: 2021-09-17

## 2021-08-16 NOTE — TELEPHONE ENCOUNTER
Alprazolam: 7/16/21      Future appt:     Your appointments     Date & Time Appointment Department [facility])    Oct 04, 2021 11:00 AM CDT Medicare Annual Well Visit with Zoran Tripathi MD 25 [facility], Ovid Councilman [address] Group

## 2021-08-23 ENCOUNTER — TELEPHONE (OUTPATIENT)
Dept: FAMILY MEDICINE CLINIC | Facility: CLINIC | Age: 76
End: 2021-08-23

## 2021-08-23 NOTE — TELEPHONE ENCOUNTER
Patient states she has not felt well x2 weeks. Nauseated/Constipated/Abd pain. Has tried stool softners without relief. Appt given today with Abby for evaluation of Sx. Alex Stafford CMA, 08/23/21, 8:59 AM      Future appt:     Your appointments

## 2021-08-23 NOTE — TELEPHONE ENCOUNTER
Pt states she does not feel good and would like to discuss with Heather if she needs to come. Pt c/o abdominal cramps this moring, having trouble with bowel movements. Call sent to [facility].

## 2021-08-24 ENCOUNTER — HOSPITAL ENCOUNTER (OUTPATIENT)
Dept: GENERAL RADIOLOGY | Age: 76
Discharge: HOME | End: 2021-08-24
Attending: NURSE PRACTITIONER
Payer: MEDICARE

## 2021-08-24 ENCOUNTER — OFFICE VISIT (OUTPATIENT)
Dept: FAMILY MEDICINE CLINIC | Facility: CLINIC | Age: 76
End: 2021-08-24
Payer: MEDICARE

## 2021-08-24 VITALS
SYSTOLIC BLOOD PRESSURE: 130 MMHG | WEIGHT: 123 LBS | DIASTOLIC BLOOD PRESSURE: 66 MMHG | HEIGHT: 62 IN | TEMPERATURE: 98 F | RESPIRATION RATE: 20 BRPM | HEART RATE: 117 BPM | BODY MASS INDEX: 22.63 KG/M2 | OXYGEN SATURATION: 96 %

## 2021-08-24 DIAGNOSIS — R10.84 GENERALIZED ABDOMINAL PAIN: Primary | ICD-10-CM

## 2021-08-24 DIAGNOSIS — R82.90 ABNORMAL URINE: ICD-10-CM

## 2021-08-24 DIAGNOSIS — R10.84 GENERALIZED ABDOMINAL PAIN: ICD-10-CM

## 2021-08-24 LAB
APPEARANCE: CLEAR
BILIRUB UR QL STRIP.AUTO: NEGATIVE
BILIRUBIN: NEGATIVE
COLOR UR AUTO: YELLOW
GLUCOSE (URINE DIPSTICK): NEGATIVE MG/DL
GLUCOSE UR STRIP.AUTO-MCNC: NEGATIVE MG/DL
KETONES (URINE DIPSTICK): NEGATIVE MG/DL
KETONES UR STRIP.AUTO-MCNC: NEGATIVE MG/DL
MULTISTIX LOT#: 5077 NUMERIC
NITRITE UR QL STRIP.AUTO: NEGATIVE
NITRITE, URINE: NEGATIVE
OCCULT BLOOD: NEGATIVE
PH UR STRIP.AUTO: 6 [PH] (ref 5–8)
PH, URINE: 5.5 (ref 4.5–8)
PROT UR STRIP.AUTO-MCNC: NEGATIVE MG/DL
PROTEIN (URINE DIPSTICK): NEGATIVE MG/DL
RBC UR QL AUTO: NEGATIVE
SP GR UR STRIP.AUTO: 1.01 (ref 1–1.03)
SPECIFIC GRAVITY: 1.01 (ref 1–1.03)
URINE-COLOR: YELLOW
UROBILINOGEN UR STRIP.AUTO-MCNC: <2 MG/DL
UROBILINOGEN,SEMI-QN: 0.2 MG/DL (ref 0–1.9)

## 2021-08-24 PROCEDURE — 99213 OFFICE O/P EST LOW 20 MIN: CPT | Performed by: NURSE PRACTITIONER

## 2021-08-24 PROCEDURE — 81001 URINALYSIS AUTO W/SCOPE: CPT | Performed by: NURSE PRACTITIONER

## 2021-08-24 PROCEDURE — 74019 RADEX ABDOMEN 2 VIEWS: CPT | Performed by: NURSE PRACTITIONER

## 2021-08-24 PROCEDURE — 81003 URINALYSIS AUTO W/O SCOPE: CPT | Performed by: NURSE PRACTITIONER

## 2021-08-24 RX ORDER — MELOXICAM 7.5 MG/1
7.5 TABLET ORAL DAILY
COMMUNITY
Start: 2021-06-07

## 2021-08-24 NOTE — PROGRESS NOTES
HPI:    Patient ID: Joshua Gutierrez is a 67year old female. HPI    Patient is present for abdominal cramps  Got shots to the shoulder on the 3rd and the 10th. Cancelled the 17th due to constipation.    States that she is tired from cramps every 10 ashley 81 mg by mouth daily. • ergocalciferol 1.25 MG (74956 UT) Oral Cap Take 1 capsule (50,000 Units total) by mouth once a week.  (Patient not taking: Reported on 8/24/2021 ) 12 capsule 0   • Cholecalciferol (VITAMIN D3) 75 MCG (3000 UT) Oral Tab Take 3,0 and neck supple. Comments: Generally weak. Needing assistance to the exam table. Skin:     General: Skin is warm and dry. Neurological:      Mental Status: She is alert and oriented to person, place, and time.    Psychiatric:         Mood and Affe

## 2021-08-24 NOTE — PATIENT INSTRUCTIONS
X-ray: constipation, Pending radiology report     Take acetaminophen for the pain (do not exceed 10 tablets daily). Obtain and take Magnesium Citrate - 1 bottle today. May repeat if needed. Ok to continue Tramadol, but decrease amount.      Increase

## 2021-08-25 ENCOUNTER — TELEPHONE (OUTPATIENT)
Dept: FAMILY MEDICINE CLINIC | Facility: CLINIC | Age: 76
End: 2021-08-25

## 2021-08-25 NOTE — TELEPHONE ENCOUNTER
----- Message from Sharyl Nageotte, APRN sent at 8/25/2021  8:19 AM CDT -----  Please let patient know the urine is negative (shows contamination). Thank you.

## 2021-09-01 DIAGNOSIS — G89.4 CHRONIC PAIN SYNDROME: ICD-10-CM

## 2021-09-01 RX ORDER — TRAMADOL HYDROCHLORIDE 50 MG/1
TABLET ORAL
Qty: 240 TABLET | Refills: 0 | Status: SHIPPED | OUTPATIENT
Start: 2021-09-01 | End: 2021-09-28

## 2021-09-16 DIAGNOSIS — G89.4 CHRONIC PAIN SYNDROME: ICD-10-CM

## 2021-09-16 NOTE — TELEPHONE ENCOUNTER
Future appt:     Your appointments     Date & Time Appointment Department [facility])    Oct 04, 2021 11:00 AM CDT Medicare Annual Well Visit with Krystian Alfred MD 25 [facility], [facility] ([facility]            [place]

## 2021-09-17 RX ORDER — ALPRAZOLAM 0.5 MG/1
TABLET ORAL
Qty: 90 TABLET | Refills: 0 | Status: SHIPPED | OUTPATIENT
Start: 2021-09-17 | End: 2021-10-16

## 2021-09-27 DIAGNOSIS — G89.4 CHRONIC PAIN SYNDROME: ICD-10-CM

## 2021-09-28 ENCOUNTER — TELEPHONE (OUTPATIENT)
Dept: FAMILY MEDICINE CLINIC | Facility: CLINIC | Age: 76
End: 2021-09-28

## 2021-09-28 DIAGNOSIS — G89.4 CHRONIC PAIN SYNDROME: ICD-10-CM

## 2021-09-28 RX ORDER — TRAMADOL HYDROCHLORIDE 50 MG/1
TABLET ORAL
Qty: 240 TABLET | Refills: 0 | Status: SHIPPED | OUTPATIENT
Start: 2021-09-28 | End: 2021-10-28

## 2021-09-28 RX ORDER — TRAMADOL HYDROCHLORIDE 50 MG/1
100 TABLET ORAL EVERY 6 HOURS PRN
Qty: 240 TABLET | Refills: 0 | Status: CANCELLED | OUTPATIENT
Start: 2021-09-28

## 2021-09-28 NOTE — TELEPHONE ENCOUNTER
Dr. Michaela Rivera is out of the office today. IL  reviewed. One refill done. Please let patient know and that she may not be able to  until 9/30.

## 2021-09-28 NOTE — TELEPHONE ENCOUNTER
Please clarify if patient needs this refill before her appointment with Dr. Jesica Shin next week. Thank you.

## 2021-09-28 NOTE — TELEPHONE ENCOUNTER
See refill encounter from 7/20/15- Duplicate request  Future appt:     Your appointments     Date & Time Appointment Department [facility])    Oct 04, 2021 11:00 AM CDT Medicare Annual Well Visit with Errol Solares MD [address] [place] [address], S

## 2021-09-28 NOTE — TELEPHONE ENCOUNTER
would like her tramadol today, has someone at the pharmacy to pick it up today along with her other medications, [facility] [place], please advise

## 2021-10-04 ENCOUNTER — OFFICE VISIT (OUTPATIENT)
Dept: FAMILY MEDICINE CLINIC | Facility: CLINIC | Age: 76
End: 2021-10-04
Payer: MEDICARE

## 2021-10-04 ENCOUNTER — LAB ENCOUNTER (OUTPATIENT)
Dept: LAB | Age: 76
End: 2021-10-04
Attending: FAMILY MEDICINE
Payer: MEDICARE

## 2021-10-04 VITALS
HEIGHT: 62 IN | WEIGHT: 119.81 LBS | DIASTOLIC BLOOD PRESSURE: 64 MMHG | SYSTOLIC BLOOD PRESSURE: 130 MMHG | HEART RATE: 108 BPM | TEMPERATURE: 97 F | RESPIRATION RATE: 20 BRPM | BODY MASS INDEX: 22.05 KG/M2

## 2021-10-04 DIAGNOSIS — R63.4 WEIGHT LOSS: ICD-10-CM

## 2021-10-04 DIAGNOSIS — M81.0 AGE-RELATED OSTEOPOROSIS WITHOUT CURRENT PATHOLOGICAL FRACTURE: ICD-10-CM

## 2021-10-04 DIAGNOSIS — F33.1 MODERATE EPISODE OF RECURRENT MAJOR DEPRESSIVE DISORDER (HCC): ICD-10-CM

## 2021-10-04 DIAGNOSIS — M15.9 PRIMARY OSTEOARTHRITIS INVOLVING MULTIPLE JOINTS: ICD-10-CM

## 2021-10-04 DIAGNOSIS — G89.4 CHRONIC PAIN SYNDROME: ICD-10-CM

## 2021-10-04 DIAGNOSIS — M79.622 LEFT UPPER ARM PAIN: ICD-10-CM

## 2021-10-04 DIAGNOSIS — Z13.6 SCREENING FOR CARDIOVASCULAR CONDITION: ICD-10-CM

## 2021-10-04 DIAGNOSIS — G89.29 CHRONIC LEFT SHOULDER PAIN: ICD-10-CM

## 2021-10-04 DIAGNOSIS — Z82.49 FAMILY HISTORY OF ISCHEMIC HEART DISEASE: ICD-10-CM

## 2021-10-04 DIAGNOSIS — I10 ESSENTIAL HYPERTENSION: ICD-10-CM

## 2021-10-04 DIAGNOSIS — M24.574 CONTRACTURE OF JOINT OF RIGHT FOOT: ICD-10-CM

## 2021-10-04 DIAGNOSIS — S42.295S OTHER CLOSED NONDISPLACED FRACTURE OF PROXIMAL END OF LEFT HUMERUS, SEQUELA: ICD-10-CM

## 2021-10-04 DIAGNOSIS — Z98.890 H/O LAMINECTOMY: ICD-10-CM

## 2021-10-04 DIAGNOSIS — Z00.00 ENCOUNTER FOR ANNUAL HEALTH EXAMINATION: Primary | ICD-10-CM

## 2021-10-04 DIAGNOSIS — M47.816 LUMBAR SPONDYLOSIS: ICD-10-CM

## 2021-10-04 DIAGNOSIS — F17.200 TOBACCO USE DISORDER: ICD-10-CM

## 2021-10-04 DIAGNOSIS — R26.2 DISABILITY OF WALKING: ICD-10-CM

## 2021-10-04 DIAGNOSIS — I73.9 PAD (PERIPHERAL ARTERY DISEASE) (HCC): ICD-10-CM

## 2021-10-04 DIAGNOSIS — J43.1 PANLOBULAR EMPHYSEMA (HCC): ICD-10-CM

## 2021-10-04 DIAGNOSIS — M79.10 MYALGIA: ICD-10-CM

## 2021-10-04 DIAGNOSIS — E78.49 FAMILIAL MULTIPLE LIPOPROTEIN-TYPE HYPERLIPIDEMIA: ICD-10-CM

## 2021-10-04 DIAGNOSIS — M25.512 CHRONIC LEFT SHOULDER PAIN: ICD-10-CM

## 2021-10-04 DIAGNOSIS — I69.351 FLACCID HEMIPLEGIA OF RIGHT DOMINANT SIDE AS LATE EFFECT OF CEREBRAL INFARCTION (HCC): ICD-10-CM

## 2021-10-04 PROBLEM — S42.209A CLOSED FRACTURE OF PROXIMAL END OF HUMERUS: Status: ACTIVE | Noted: 2021-10-04

## 2021-10-04 PROCEDURE — 80061 LIPID PANEL: CPT | Performed by: FAMILY MEDICINE

## 2021-10-04 PROCEDURE — 84443 ASSAY THYROID STIM HORMONE: CPT | Performed by: FAMILY MEDICINE

## 2021-10-04 PROCEDURE — 99213 OFFICE O/P EST LOW 20 MIN: CPT | Performed by: FAMILY MEDICINE

## 2021-10-04 PROCEDURE — 36415 COLL VENOUS BLD VENIPUNCTURE: CPT | Performed by: FAMILY MEDICINE

## 2021-10-04 PROCEDURE — G0439 PPPS, SUBSEQ VISIT: HCPCS | Performed by: FAMILY MEDICINE

## 2021-10-04 PROCEDURE — 85025 COMPLETE CBC W/AUTO DIFF WBC: CPT | Performed by: FAMILY MEDICINE

## 2021-10-04 PROCEDURE — 80053 COMPREHEN METABOLIC PANEL: CPT | Performed by: FAMILY MEDICINE

## 2021-10-04 NOTE — PATIENT INSTRUCTIONS
Recommended Websites for Advanced Directives    SeekAlumni.no. org/publications/Documents/personal_dec. pdf  An information packet, including necessary form from the [address] website. http://www. [place]state. [place]/public/books/adv

## 2021-10-04 NOTE — PROGRESS NOTES
REASON FOR VISIT:    Teresa Leal is a 67year old female who presents for a Medicare Annual Wellness visit. She is very frustrated. She said that she just does not feel good and does not feel like things are getting better.     She has chronic pain 50 MG Oral Tab TAKE 2 TABLETS BY MOUTH EVERY 6 HOURS AS NEEDED FOR PAIN 240 tablet 0   • ALPRAZOLAM 0.5 MG Oral Tab TAKE 1 TABLET BY MOUTH THREE TIMES DAILY AS NEEDED FOR ANXIETY 90 tablet 0   • Meloxicam 7.5 MG Oral Tab Take 7.5 mg by mouth daily.      • o 9(L) 11(L) 13(L) 11(L) 7(L) 9(L)   ALT 13 - 56 U/L 15 12(L) 13 14 17 15     TSH and Free T4 Latest Ref Rng & Units 10/4/2021 9/25/2020 3/19/2019 12/18/2017   TSH 0.358 - 3.740 mIU/mL 1.700 1.250 1.030 1.660        General Health      In the past six months INDICATIONS AND SCHEDULE Internal Lab or Procedure   Diabetes Screening     HbgA1C   Annually No results found for: A1C    Fasting Blood Sugar (FSB)Annually Glucose (mg/dL)   Date Value   10/04/2021 84      Cardiovascular Disease Screening    LDL Annuall (cerebral vascular accident) [facility])    • Essential hypertension    • Hyperlipidemia    • Osteoarthritis       Past Surgical History:   Procedure Laterality Date   • APPENDECTOMY     • BACK SURGERY     • CATARACT     • CHOLECYSTECTOMY     • HIP REPLACEMENT S Encounters:  10/04/21 : 130/64  08/24/21 : 130/66  04/26/21 : 136/84    GENERAL: well developed, well nourished, in no apparent distress   SKIN: no rashes, no suspicious lesions  HEENT: atraumatic, normocephalic, ears and throat are clear                He PANEL  - COMP METABOLIC PANEL (14)  - TSH W REFLEX TO FREE T4  - CBC WITH DIFFERENTIAL WITH PLATELET    4. Essential hypertension  Pressure control is good with the current medications.   No new treatment now.  - OFFICE/OUTPT VISIT,EST,LEVL III  - LIPID PAN severe allergic reactions to most antidepressants. No new treatment recommended now. - OFFICE/OUTPT VISIT,EST,LEVL III  - LIPID PANEL; Future  - COMP METABOLIC PANEL (14);  Future  - TSH W REFLEX TO FREE T4; Future  - CBC WITH DIFFERENTIAL WITH PLATELET; PANEL; Future  - LIPID PANEL    20. Lumbar spondylosis  She has a history of  lumbar spondylosis. 21. Other closed nondisplaced fracture of proximal end of left humerus, sequela  She had a past history of a left humeral fracture.   She does still have a

## 2021-10-05 ENCOUNTER — TELEPHONE (OUTPATIENT)
Dept: FAMILY MEDICINE CLINIC | Facility: CLINIC | Age: 76
End: 2021-10-05

## 2021-10-05 NOTE — TELEPHONE ENCOUNTER
----- Message from Cesar Escalante MD sent at 10/5/2021 12:44 PM CDT -----  Good news on the blood work. Cholesterol is normal at 210. Blood sugar is normal at 84.   Kidney function is normal.  Liver testing is normal.  Thyroid is normal.  CBC is normal

## 2021-10-16 DIAGNOSIS — G89.4 CHRONIC PAIN SYNDROME: ICD-10-CM

## 2021-10-16 DIAGNOSIS — R11.0 NAUSEA: ICD-10-CM

## 2021-10-16 NOTE — TELEPHONE ENCOUNTER
Future appt:     Last Appointment with provider:   10/4/2021; Return in 1 year (on 10/4/2022).     [place] appointment [place] [facility] [place]:  10/4/2021  Cholesterol, Total (mg/dL)   Date Value   10/04/2021 210 (H)     HDL Cholesterol (mg/dL)   Date Value   10/04/20

## 2021-10-18 RX ORDER — ALPRAZOLAM 0.5 MG/1
TABLET ORAL
Qty: 90 TABLET | Refills: 1 | Status: SHIPPED | OUTPATIENT
Start: 2021-10-18 | End: 2021-12-13

## 2021-10-18 RX ORDER — ONDANSETRON 4 MG/1
TABLET, FILM COATED ORAL
Qty: 30 TABLET | Refills: 1 | Status: SHIPPED | OUTPATIENT
Start: 2021-10-18 | End: 2022-01-07

## 2021-10-19 NOTE — TELEPHONE ENCOUNTER
[facility] Pharmacist informed to not fill the   Alprazolam.  Spoke with Daughter Akira Adames-  Patient started taking a lot more Alprazolam when she broke her arm. Patient also given Norco by Ortho. Akira Adames informed Alprazolam will be not filled early.     Appt Nutrition Services Progress Note    Physician: Darren Painter MD  Diagnosis: Pancreas  Treatment:  Modified FOLFIRINOX    Food and Nutrition Related History:  Met with patient and best friend in f/u during her C2D1 of chemotherapy.  She notes improvement in her appetite/intake with recent break from chemo due to a positive COVID exposure, she denied testing positive.  She describes a good variety of foods consumed including meatloaf, roast, hamburgers and her DIL's homemade chili.  She is back to eating 3 meals per day and feels her intake is back to her baseline.  She notes she has never been a snacker.  She is not currently drinking the Boost, notes she will when she feels she hasn't had enough to eat on any particular day.  She will take her anti-emetics scheduled for the next 2 day and then will continue for 3-4 days or as needed post chemo.  She notes she is keeping busy with her daughter's family moving in with them after the sale of their house. She denies specific nutrition related questions/concerns.  Anthropometric Measurements:  Estimated body mass index is 21.52 kg/m² as calculated from the following:    Height as of 9/27/21: 5' 1\" (1.549 m).    Weight as of this encounter: 51.6 kg (113 lb 13.9 oz).    Wt Readings from Last 5 Encounters:   10/19/21 51.6 kg (113 lb 13.9 oz)   09/27/21 52.7 kg (116 lb 4.7 oz)   09/24/21 53.5 kg (118 lb)   09/16/21 53 kg (116 lb 12.8 oz)   09/14/21 53.8 kg (118 lb 11.5 oz)     Usual Weight:  62 kg (1 yr ago)  Weight Change:  -11 kg overall, down 1 kg x 3 weeks    Biochemical Data, Medical Tests, and Procedures:    Nutrition-Focused Physical Findings:  Extremities, muscles and bones: NFPA deferred due to COVID    Comparative Standards:  Estimated energy needs -  Total energy estimated needs (CS-1.1.1):   Calculation Weight: 62 kg  Calorie needs: 6983-1645 (30 - 35 kcal/kg)  Protein needs: 74-93 gm (1.2-1.5 g/kg)    Nutrition Diagnosis:  Unintentional weight loss  related to  pain with eating in the setting of pancreatic cancer as evidenced by pt report, diet recall, 14% body wt loss x 1 yr.      Intervention:  General/healthful diet: Continue general diet as tolerated, only avoid those foods she finds she does not tolerate although currently is enjoying a variety of foods.  Specific foods/beverages or groups: Discussed caffeine free fluids, especially during chemotherapy  Commercial beverage: Continue Boost as tolerated, one per day, skip if eating well to avoid taste fatigue although currently she enjoys it.  Medications: Discussed scheduled Zofran, Compazine PRN, reviewedd the \"call us first\" campaign.  Team meeting: Discussed RD availability, RD card provided, will continue to follow.      Monitoring and Evaluation:  Oral fluids: Adequate fluids to maintain hydration  Amount of food: Consume >75% of EEE consistently  Weight: Minimize further wt loss

## 2021-10-20 NOTE — TELEPHONE ENCOUNTER
Future appt:    Last Appointment:  Visit date not found  Cholesterol, Total (mg/dL)   Date Value   03/19/2019 207 (H)     HDL Cholesterol (mg/dL)   Date Value   03/19/2019 40     LDL Cholesterol (mg/dL)   Date Value   03/19/2019 129 (H)     Triglycerides (
Reviewed [facility].
Render Risk Assessment In Note?: no
Additional Notes: pt does not wish to treat at this time
Detail Level: Simple

## 2021-10-21 ENCOUNTER — TELEPHONE (OUTPATIENT)
Dept: FAMILY MEDICINE CLINIC | Facility: CLINIC | Age: 76
End: 2021-10-21

## 2021-10-21 DIAGNOSIS — J44.9 CHRONIC OBSTRUCTIVE PULMONARY DISEASE, UNSPECIFIED COPD TYPE (HCC): ICD-10-CM

## 2021-10-21 RX ORDER — BUDESONIDE AND FORMOTEROL FUMARATE DIHYDRATE 160; 4.5 UG/1; UG/1
2 AEROSOL RESPIRATORY (INHALATION) 2 TIMES DAILY
Qty: 10.2 G | Refills: 0 | Status: SHIPPED | OUTPATIENT
Start: 2021-10-21 | End: 2021-11-23

## 2021-10-21 NOTE — TELEPHONE ENCOUNTER
Future appt:     Last Appointment with provider:   10/4/2021- advised Return in 1 year (on 10/4/2022).       Last refill: 5/24/21- symbicort    Cholesterol, Total (mg/dL)   Date Value   10/04/2021 210 (H)     HDL Cholesterol (mg/dL)   Date Value   10/04/202

## 2021-10-28 DIAGNOSIS — G89.4 CHRONIC PAIN SYNDROME: ICD-10-CM

## 2021-10-28 RX ORDER — TRAMADOL HYDROCHLORIDE 50 MG/1
TABLET ORAL
Qty: 240 TABLET | Refills: 0 | Status: SHIPPED | OUTPATIENT
Start: 2021-10-28 | End: 2021-11-23

## 2021-10-28 NOTE — TELEPHONE ENCOUNTER
Future appt:    Last Appointment with provider:   10/4/21- physical. Return in 1 year  Last appointment at [facility]:  10/4/2021  Cholesterol, Total (mg/dL)   Date Value   10/04/2021 210 (H)     HDL Cholesterol (mg/dL)   Date Value   10/04/2021 42     L

## 2021-11-23 DIAGNOSIS — G89.4 CHRONIC PAIN SYNDROME: ICD-10-CM

## 2021-11-23 DIAGNOSIS — J44.9 CHRONIC OBSTRUCTIVE PULMONARY DISEASE, UNSPECIFIED COPD TYPE (HCC): ICD-10-CM

## 2021-11-23 RX ORDER — TRAMADOL HYDROCHLORIDE 50 MG/1
TABLET ORAL
Qty: 240 TABLET | Refills: 0 | Status: SHIPPED | OUTPATIENT
Start: 2021-11-23 | End: 2021-12-22

## 2021-11-23 NOTE — TELEPHONE ENCOUNTER
Future appt:    Last Appointment with provider:   10/4/2021 for annual physical  Last appointment at [facility]:  10/4/2021  Cholesterol, Total (mg/dL)   Date Value   10/04/2021 210 (H)     HDL Cholesterol (mg/dL)   Date Value   10/04/2021 42     LDL Cho

## 2021-11-23 NOTE — TELEPHONE ENCOUNTER
Future appt:     Last Appointment with provider: 10/4/21- advised Return in 1 year (on 10/4/2022).     Last refill: 10/28/21- tramadol 50 mg #240  0 refills    Cholesterol, Total (mg/dL)   Date Value   10/04/2021 210 (H)     HDL Cholesterol (mg/dL)   Date V

## 2021-11-24 RX ORDER — BUDESONIDE AND FORMOTEROL FUMARATE DIHYDRATE 160; 4.5 UG/1; UG/1
AEROSOL RESPIRATORY (INHALATION)
Qty: 3 EACH | Refills: 1 | Status: SHIPPED | OUTPATIENT
Start: 2021-11-24

## 2021-11-25 DIAGNOSIS — G89.4 CHRONIC PAIN SYNDROME: ICD-10-CM

## 2021-11-26 RX ORDER — TRAMADOL HYDROCHLORIDE 50 MG/1
TABLET ORAL
Qty: 240 TABLET | Refills: 0 | OUTPATIENT
Start: 2021-11-26

## 2021-11-26 NOTE — TELEPHONE ENCOUNTER
11/23/21 refill has been received by   [facility]. It was a refill too soon. Pharmacist states she can now fill. In process of being filled at [facility].

## 2021-11-26 NOTE — TELEPHONE ENCOUNTER
Pt calling and states she is out of her Tramadol and asking for a refill today to be sent to [facility] in [place]. Please advise.

## 2021-12-11 DIAGNOSIS — G89.4 CHRONIC PAIN SYNDROME: ICD-10-CM

## 2021-12-11 NOTE — TELEPHONE ENCOUNTER
Future appt:    Last Appointment with provider:   10/4/2021 for annual physical.  [place] appointment [place] [facility] [place]:  10/4/2021  Cholesterol, Total (mg/dL)   Date Value   10/04/2021 210 (H)     HDL Cholesterol (mg/dL)   Date Value   10/04/2021 42     LDL Ch

## 2021-12-13 RX ORDER — ALPRAZOLAM 0.5 MG/1
TABLET ORAL
Qty: 90 TABLET | Refills: 0 | Status: SHIPPED | OUTPATIENT
Start: 2021-12-13 | End: 2022-01-11

## 2021-12-22 DIAGNOSIS — G89.4 CHRONIC PAIN SYNDROME: ICD-10-CM

## 2021-12-22 RX ORDER — TRAMADOL HYDROCHLORIDE 50 MG/1
TABLET ORAL
Qty: 240 TABLET | Refills: 0 | Status: SHIPPED | OUTPATIENT
Start: 2021-12-22 | End: 2022-01-19

## 2021-12-22 NOTE — TELEPHONE ENCOUNTER
Future appt:    Last Appointment with provider:   10/4/2021  Last appointment [place] [facility] [place]:  10/4/2021  Cholesterol, Total (mg/dL)   Date Value   10/04/2021 210 (H)     HDL Cholesterol (mg/dL)   Date Value   10/04/2021 42     LDL Cholesterol (mg/dL)   D

## 2022-01-07 DIAGNOSIS — J44.9 CHRONIC OBSTRUCTIVE PULMONARY DISEASE, UNSPECIFIED COPD TYPE (HCC): ICD-10-CM

## 2022-01-07 DIAGNOSIS — R11.0 NAUSEA: ICD-10-CM

## 2022-01-07 RX ORDER — BUDESONIDE AND FORMOTEROL FUMARATE DIHYDRATE 160; 4.5 UG/1; UG/1
2 AEROSOL RESPIRATORY (INHALATION) 2 TIMES DAILY
Qty: 3 EACH | Refills: 1 | Status: CANCELLED | OUTPATIENT
Start: 2022-01-07

## 2022-01-07 RX ORDER — ONDANSETRON 4 MG/1
4 TABLET, FILM COATED ORAL EVERY 8 HOURS PRN
Qty: 30 TABLET | Refills: 1 | Status: SHIPPED | OUTPATIENT
Start: 2022-01-07

## 2022-01-07 NOTE — TELEPHONE ENCOUNTER
Odansetron: 10/18/21    Future appt:    Last Appointment with provider:   10/4/2021  Last appointment at [facility] [place]:  10/4/2021  Cholesterol, Total (mg/dL)   Date Value   10/04/2021 210 (H)     HDL Cholesterol (mg/dL)   Date Value   10/04/2021 42     LDL

## 2022-01-10 DIAGNOSIS — G89.4 CHRONIC PAIN SYNDROME: ICD-10-CM

## 2022-01-11 RX ORDER — ALPRAZOLAM 0.5 MG/1
TABLET ORAL
Qty: 90 TABLET | Refills: 0 | Status: SHIPPED | OUTPATIENT
Start: 2022-01-11

## 2022-01-11 NOTE — TELEPHONE ENCOUNTER
Alprazolam: 12/13/21     Return in 1 year (on 10/4/2022).     Future appt:    Last Appointment with provider:   10/4/2021  Last appointment at [facility] [place]:  10/4/2021  Cholesterol, Total (mg/dL)   Date Value   10/04/2021 210 (H)     HDL Cholesterol (mg/dL)

## 2022-01-17 ENCOUNTER — TELEPHONE (OUTPATIENT)
Dept: FAMILY MEDICINE CLINIC | Facility: CLINIC | Age: 77
End: 2022-01-17

## 2022-01-17 NOTE — TELEPHONE ENCOUNTER
Didn't have Covid, only stomach pains bad, lost a little weight. Her weight is 111lbs. She's a little scared, losing the weight  No future appointments.

## 2022-01-17 NOTE — TELEPHONE ENCOUNTER
Agree with increasing Boost or Ensure or other type of liquid nutritional intake. Also agree with trying to increase food intake during the course of the day.

## 2022-01-17 NOTE — TELEPHONE ENCOUNTER
Family with Covid19 around [place]. Patient did not test for covid. States she had severe abdominal pain for  A few day. No appetite. Patient is down to 111 lb. Very concerned. Asking what she can do. Advised 2 Boost drinks a day.   Increase pr

## 2022-01-19 DIAGNOSIS — G89.4 CHRONIC PAIN SYNDROME: ICD-10-CM

## 2022-01-19 RX ORDER — BENAZEPRIL HYDROCHLORIDE 40 MG/1
TABLET, FILM COATED ORAL
Qty: 90 TABLET | Refills: 0 | Status: SHIPPED | OUTPATIENT
Start: 2022-01-19

## 2022-01-19 RX ORDER — TRAMADOL HYDROCHLORIDE 50 MG/1
TABLET ORAL
Qty: 240 TABLET | Refills: 0 | Status: SHIPPED | OUTPATIENT
Start: 2022-01-19 | End: 2022-01-21

## 2022-01-19 NOTE — TELEPHONE ENCOUNTER
Tramadol: 12/22/01    Future appt:    Last Appointment with provider:   Visit date not found  Last appointment at [facility] [place]:  10/4/2021  Cholesterol, Total (mg/dL)   Date Value   10/04/2021 210 (H)     HDL Cholesterol (mg/dL)   Date Value   10/04/2021 4

## 2022-01-19 NOTE — TELEPHONE ENCOUNTER
Benazepril: 5/7/21    Future appt:    Last Appointment with provider:   10/4/2021  Last appointment at [facility] [place]:  10/4/2021  Cholesterol, Total (mg/dL)   Date Value   10/04/2021 210 (H)     HDL Cholesterol (mg/dL)   Date Value   10/04/2021 42     LDL C

## 2022-01-21 ENCOUNTER — TELEPHONE (OUTPATIENT)
Dept: FAMILY MEDICINE CLINIC | Facility: CLINIC | Age: 77
End: 2022-01-21

## 2022-01-21 DIAGNOSIS — G89.4 CHRONIC PAIN SYNDROME: ICD-10-CM

## 2022-01-21 RX ORDER — TRAMADOL HYDROCHLORIDE 50 MG/1
100 TABLET ORAL EVERY 6 HOURS PRN
Qty: 240 TABLET | Refills: 0 | Status: SHIPPED | OUTPATIENT
Start: 2022-01-21

## 2022-01-21 NOTE — TELEPHONE ENCOUNTER
Patient was not able to get her Tramadol refilled. Phoned [facility]--  They states they do not have any Tramadol in stock. Patient requesting Rx to [facility].   Mary Scruggs CMA, 01/21/22, 1:32 PM

## 2022-01-21 NOTE — TELEPHONE ENCOUNTER
Would like a refill on her Tramadol, has been sick, never got tested, please let her know, did let her know about a appointment that's due  No future appointments.

## 2022-02-08 RX ORDER — ALPRAZOLAM 0.5 MG/1
0.5 TABLET ORAL 3 TIMES DAILY PRN
Qty: 90 TABLET | Refills: 0 | Status: SHIPPED | OUTPATIENT
Start: 2022-02-08 | End: 2022-03-07

## 2022-02-08 RX ORDER — ONDANSETRON 4 MG/1
4 TABLET, FILM COATED ORAL EVERY 8 HOURS PRN
Qty: 30 TABLET | Refills: 1 | Status: SHIPPED | OUTPATIENT
Start: 2022-02-08

## 2022-02-08 NOTE — TELEPHONE ENCOUNTER
Refill on her medication for Alprazolam 0.5mg & Ondanseltron 4mg. Patient changed her pharmacy to [facility] in [place]  No future appointments.

## 2022-02-08 NOTE — TELEPHONE ENCOUNTER
Alprazolam: 1/11/22  Zofran: 1/7/22    Future appt:    Last Appointment with provider:   10/4/2021  Last appointment at [facility]:  10/4/2021  Cholesterol, Total (mg/dL)   Date Value   10/04/2021 210 (H)     HDL Cholesterol (mg/dL)   Date Value   10/04/2021 42     LDL Cholesterol (mg/dL)   Date Value   10/04/2021 138 (H)     Triglycerides (mg/dL)   Date Value   10/04/2021 169 (H)     No results found for: EAG, A1C  Lab Results   Component Value Date    TSH 1.700 10/04/2021       No follow-ups on file.

## 2022-02-17 NOTE — TELEPHONE ENCOUNTER
Future appt:    Last Appointment with provider:   10/4/2021 - px - 1 yr f/u  Last appointment at [facility] [place]:  10/4/2021      Cholesterol, Total (mg/dL)   Date Value   10/04/2021 210 (H)     HDL Cholesterol (mg/dL)   Date Value   10/04/2021 42     LDL Cholesterol (mg/dL)   Date Value   10/04/2021 138 (H)     Triglycerides (mg/dL)   Date Value   10/04/2021 169 (H)     No results found for: EAG, A1C  Lab Results   Component Value Date    TSH 1.700 10/04/2021       No follow-ups on file.

## 2022-02-18 RX ORDER — TRAMADOL HYDROCHLORIDE 50 MG/1
TABLET ORAL
Qty: 240 TABLET | Refills: 0 | Status: SHIPPED | OUTPATIENT
Start: 2022-02-18 | End: 2022-03-18

## 2022-03-07 RX ORDER — ALPRAZOLAM 0.5 MG/1
TABLET ORAL
Qty: 90 TABLET | Refills: 0 | Status: SHIPPED | OUTPATIENT
Start: 2022-03-07 | End: 2022-04-04

## 2022-03-07 NOTE — TELEPHONE ENCOUNTER
Future appt: Your appointments     Date & Time Appointment Department [facility])    Oct 10, 2022 11:15 AM CDT Medicare Annual Well Visit with Aisha Escobedo MD 25 [facility] ([facility])            25 [facility]  [address] [id number]  [phone]        Last Appointment with provider:   10/4/2021  [place] appointment [place] [facility] [place]:  10/4/2021  Cholesterol, Total (mg/dL)   Date Value   10/04/2021 210 (H)     HDL Cholesterol (mg/dL)   Date Value   10/04/2021 42     LDL Cholesterol (mg/dL)   Date Value   10/04/2021 138 (H)     Triglycerides (mg/dL)   Date Value   10/04/2021 169 (H)     No results found for: EAG, A1C  Lab Results   Component Value Date    TSH 1.700 10/04/2021     Last RF:  2/8/2022    No follow-ups on file.

## 2022-03-18 RX ORDER — TRAMADOL HYDROCHLORIDE 50 MG/1
TABLET ORAL
Qty: 240 TABLET | Refills: 0 | Status: SHIPPED | OUTPATIENT
Start: 2022-03-18

## 2022-04-04 RX ORDER — ALPRAZOLAM 0.5 MG/1
TABLET ORAL
Qty: 90 TABLET | Refills: 0 | Status: SHIPPED | OUTPATIENT
Start: 2022-04-04

## 2022-04-04 NOTE — TELEPHONE ENCOUNTER
Future appt: Your appointments     Date & Time Appointment Department [facility])    Oct 10, 2022 11:15 AM CDT Medicare Annual Well Visit with Oxana Wu MD 25 [facility] ([place])            25 [facility] [address] [id number]  [phone]        Last Appointment with provider:  10/4/21 for annual physical.  Last appointment at [facility]:  Visit date not found  Cholesterol, Total (mg/dL)   Date Value   10/04/2021 210 (H)     HDL Cholesterol (mg/dL)   Date Value   10/04/2021 42     LDL Cholesterol (mg/dL)   Date Value   10/04/2021 138 (H)     Triglycerides (mg/dL)   Date Value   10/04/2021 169 (H)     No results found for: EAG, A1C  Lab Results   Component Value Date    TSH 1.700 10/04/2021       No follow-ups on file.

## 2022-04-13 RX ORDER — ONDANSETRON 4 MG/1
TABLET, FILM COATED ORAL
Qty: 30 TABLET | Refills: 1 | Status: SHIPPED | OUTPATIENT
Start: 2022-04-13

## 2022-04-13 RX ORDER — TRAMADOL HYDROCHLORIDE 50 MG/1
TABLET ORAL
Qty: 240 TABLET | Refills: 0 | Status: SHIPPED | OUTPATIENT
Start: 2022-04-13

## 2022-04-13 NOTE — TELEPHONE ENCOUNTER
Odansetron: 2/8/22  Tramadol: 3/18/22-------Refill To Soon    Future appt: Your appointments     Date & Time Appointment Department [facility])    Oct 10, 2022 11:15 AM CDT Medicare Annual Well Visit with Dylan Saini MD 25 [facility] [place] ([facility])            25 [id number]  [phone]        Last Appointment with provider:   Visit date not found  [place] [facility] [place]:  Visit date not found  Cholesterol, Total (mg/dL)   Date Value   10/04/2021 210 (H)     HDL Cholesterol (mg/dL)   Date Value   10/04/2021 42     LDL Cholesterol (mg/dL)   Date Value   10/04/2021 138 (H)     Triglycerides (mg/dL)   Date Value   10/04/2021 169 (H)     No results found for: EAG, A1C  Lab Results   Component Value Date    TSH 1.700 10/04/2021       No follow-ups on file.

## 2022-05-03 RX ORDER — ALPRAZOLAM 0.5 MG/1
TABLET ORAL
Qty: 90 TABLET | Refills: 0 | Status: SHIPPED | OUTPATIENT
Start: 2022-05-03

## 2022-05-03 NOTE — TELEPHONE ENCOUNTER
Alprazolam: 4/4/22    Future appt: Your appointments     Date & Time Appointment Department [facility])    Oct 10, 2022 11:15 AM CDT Medicare Annual Well Visit with Vinay Abarca MD 25 [facility], [facility] ([facility])            25 [facility] [id number]  [phone]        Last Appointment with provider:   Visit date not found  Last appointment at [facility] [place]:  Visit date not found  Cholesterol, Total (mg/dL)   Date Value   10/04/2021 210 (H)     HDL Cholesterol (mg/dL)   Date Value   10/04/2021 42     LDL Cholesterol (mg/dL)   Date Value   10/04/2021 138 (H)     Triglycerides (mg/dL)   Date Value   10/04/2021 169 (H)     No results found for: EAG, A1C  Lab Results   Component Value Date    TSH 1.700 10/04/2021       No follow-ups on file.

## 2022-05-10 RX ORDER — BENAZEPRIL HYDROCHLORIDE 40 MG/1
40 TABLET, FILM COATED ORAL DAILY
Qty: 90 TABLET | Refills: 1 | Status: SHIPPED | OUTPATIENT
Start: 2022-05-10

## 2022-05-10 RX ORDER — TRAMADOL HYDROCHLORIDE 50 MG/1
100 TABLET ORAL EVERY 6 HOURS PRN
Qty: 240 TABLET | Refills: 0 | Status: SHIPPED | OUTPATIENT
Start: 2022-05-10

## 2022-05-10 NOTE — TELEPHONE ENCOUNTER
Benazepril: 1/19/22  Tramadol: 4/13/22    Future appt: Your appointments     Date & Time Appointment Department [facility])    Oct 10, 2022 11:15 AM CDT Medicare Annual Well Visit with Antonio Petersen MD 25 [facility], Kim Bass ([facility])            25 [facility] [id number]  [phone]        Last Appointment with provider:   Visit date not found  [facility] appointment [facility]:  Visit date not found  Cholesterol, Total (mg/dL)   Date Value   10/04/2021 210 (H)     HDL Cholesterol (mg/dL)   Date Value   10/04/2021 42     LDL Cholesterol (mg/dL)   Date Value   10/04/2021 138 (H)     Triglycerides (mg/dL)   Date Value   10/04/2021 169 (H)     No results found for: EAG, A1C  Lab Results   Component Value Date    TSH 1.700 10/04/2021       No follow-ups on file.

## 2022-05-31 DIAGNOSIS — G89.4 CHRONIC PAIN SYNDROME: ICD-10-CM

## 2022-05-31 RX ORDER — ALPRAZOLAM 0.5 MG/1
TABLET ORAL
Qty: 90 TABLET | Refills: 0 | Status: SHIPPED | OUTPATIENT
Start: 2022-05-31

## 2022-05-31 NOTE — TELEPHONE ENCOUNTER
Future appt: Your appointments     Date & Time Appointment Department [facility])    Oct 10, 2022 11:15 AM CDT Medicare Annual Well Visit with Amado Recinos MD 25 [facility], [facility] ([place])            25 [facility] [phone]  [phone]        Last Appointment with provider: 10/4/21 for annual physical.  Last appointment at [facility]:  Visit date not found  Cholesterol, Total (mg/dL)   Date Value   10/04/2021 210 (H)     HDL Cholesterol (mg/dL)   Date Value   10/04/2021 42     LDL Cholesterol (mg/dL)   Date Value   10/04/2021 138 (H)     Triglycerides (mg/dL)   Date Value   10/04/2021 169 (H)     No results found for: EAG, A1C  Lab Results   Component Value Date    TSH 1.700 10/04/2021       No follow-ups on file.

## 2022-06-10 DIAGNOSIS — G89.4 CHRONIC PAIN SYNDROME: ICD-10-CM

## 2022-06-10 RX ORDER — TRAMADOL HYDROCHLORIDE 50 MG/1
TABLET ORAL
Qty: 240 TABLET | Refills: 0 | Status: SHIPPED | OUTPATIENT
Start: 2022-06-10

## 2022-06-10 NOTE — TELEPHONE ENCOUNTER
Tramadol: 5/10/22    Future appt: Your appointments     Date & Time Appointment Department [facility])    Oct 10, 2022 11:15 AM CDT Medicare Annual Well Visit with Rekha Nolen MD 25 [facility], [facility] ([place])            [address] [place] [address] [place]  [facility]76 [address]  [phone]        Last Appointment with provider:  10/4/21  Last appointment at [facility] [place]:  Visit date not found  Cholesterol, Total (mg/dL)   Date Value   10/04/2021 210 (H)     HDL Cholesterol (mg/dL)   Date Value   10/04/2021 42     LDL Cholesterol (mg/dL)   Date Value   10/04/2021 138 (H)     Triglycerides (mg/dL)   Date Value   10/04/2021 169 (H)     No results found for: EAG, A1C  Lab Results   Component Value Date    TSH 1.700 10/04/2021       No follow-ups on file.

## 2022-06-28 DIAGNOSIS — G89.4 CHRONIC PAIN SYNDROME: ICD-10-CM

## 2022-06-28 RX ORDER — ALPRAZOLAM 0.5 MG/1
TABLET ORAL
Qty: 90 TABLET | Refills: 0 | Status: SHIPPED | OUTPATIENT
Start: 2022-06-28

## 2022-06-28 NOTE — TELEPHONE ENCOUNTER
Alprazolam: 5/31/22    Future appt: Your appointments     Date & Time Appointment Department [facility])    Oct 10, 2022 11:15 AM CDT Medicare Annual Well Visit with Antonio Petersen MD 25 [facility] ([facility])            25 [facility]  [facility] [phone]  [phone]        Last Appointment with provider:   Visit date not found  Last appointment at [facility] [place]:  Visit date not found  Cholesterol, Total (mg/dL)   Date Value   10/04/2021 210 (H)     HDL Cholesterol (mg/dL)   Date Value   10/04/2021 42     LDL Cholesterol (mg/dL)   Date Value   10/04/2021 138 (H)     Triglycerides (mg/dL)   Date Value   10/04/2021 169 (H)     No results found for: EAG, A1C  Lab Results   Component Value Date    TSH 1.700 10/04/2021       No follow-ups on file.

## 2022-06-30 DIAGNOSIS — J44.9 CHRONIC OBSTRUCTIVE PULMONARY DISEASE, UNSPECIFIED COPD TYPE (HCC): ICD-10-CM

## 2022-06-30 NOTE — TELEPHONE ENCOUNTER
Future appt: Your appointments     Date & Time Appointment Department [facility])    Oct 10, 2022 11:15 AM CDT Medicare Annual Well Visit with Dylan Saini MD 25 [facility] ([facility])            [address] [place] [address] [phone]  [phone]        Last Appointment with provider:   10/4/21(PX)  Last appointment at [facility]:  Visit date not found  Cholesterol, Total (mg/dL)   Date Value   10/04/2021 210 (H)     HDL Cholesterol (mg/dL)   Date Value   10/04/2021 42     LDL Cholesterol (mg/dL)   Date Value   10/04/2021 138 (H)     Triglycerides (mg/dL)   Date Value   10/04/2021 169 (H)     No results found for: EAG, A1C  Lab Results   Component Value Date    TSH 1.700 10/04/2021       No follow-ups on file.

## 2022-07-01 RX ORDER — BUDESONIDE AND FORMOTEROL FUMARATE DIHYDRATE 160; 4.5 UG/1; UG/1
AEROSOL RESPIRATORY (INHALATION)
Qty: 30.6 G | Refills: 1 | Status: SHIPPED | OUTPATIENT
Start: 2022-07-01

## 2022-07-08 DIAGNOSIS — G89.4 CHRONIC PAIN SYNDROME: ICD-10-CM

## 2022-07-08 RX ORDER — TRAMADOL HYDROCHLORIDE 50 MG/1
100 TABLET ORAL EVERY 6 HOURS PRN
Qty: 240 TABLET | Refills: 0 | Status: SHIPPED | OUTPATIENT
Start: 2022-07-08 | End: 2022-07-11

## 2022-07-08 NOTE — TELEPHONE ENCOUNTER
Future appt: Your appointments     Date & Time Appointment Department [facility])    Oct 10, 2022 11:15 AM CDT Medicare Annual Well Visit with Kassandra Grayson MD [address] [facility] ([facility])            [address] [place] [address] [facility] [place] [address] [address]  [phone]        Last Appointment with provider: 10/4/21 for annual physical and shoulder pain. [facility] appointment [facility] [place]:  Visit date not found  Cholesterol, Total (mg/dL)   Date Value   10/04/2021 210 (H)     HDL Cholesterol (mg/dL)   Date Value   10/04/2021 42     LDL Cholesterol (mg/dL)   Date Value   10/04/2021 138 (H)     Triglycerides (mg/dL)   Date Value   10/04/2021 169 (H)     No results found for: EAG, A1C  Lab Results   Component Value Date    TSH 1.700 10/04/2021       No follow-ups on file. [Patient Intake Form Reviewed] : Patient intake form was reviewed [Anxiety] : anxiety [Depression] : depression [Suicidal] : not suicidal

## 2022-07-11 ENCOUNTER — TELEPHONE (OUTPATIENT)
Dept: FAMILY MEDICINE CLINIC | Facility: CLINIC | Age: 77
End: 2022-07-11

## 2022-07-11 DIAGNOSIS — G89.4 CHRONIC PAIN SYNDROME: ICD-10-CM

## 2022-07-11 RX ORDER — TRAMADOL HYDROCHLORIDE 50 MG/1
100 TABLET ORAL EVERY 6 HOURS PRN
Qty: 240 TABLET | Refills: 0 | Status: SHIPPED | OUTPATIENT
Start: 2022-07-11 | End: 2022-08-10

## 2022-07-11 NOTE — TELEPHONE ENCOUNTER
[facility] pharmacy never received her prescription ok.   Needs to be re-sent  Future Appointments   Date Time Provider Aroldo Lopez   10/10/2022 11:15 AM MD ANALY Valdes

## 2022-07-11 NOTE — TELEPHONE ENCOUNTER
Confirmed with Christal--  Fax for Tramadol not received 7/8/22    Please resend RxEtienne Lombardo, NIC, 07/11/22, 9:24 AM

## 2022-07-26 DIAGNOSIS — G89.4 CHRONIC PAIN SYNDROME: ICD-10-CM

## 2022-07-26 DIAGNOSIS — R11.0 NAUSEA: ICD-10-CM

## 2022-07-26 NOTE — TELEPHONE ENCOUNTER
Alprazolam: 6/28/22  ProAir: 10/18/21  Ondansetron: 4/13/22    Future appt: Your appointments     Date & Time Appointment Department [facility])    Oct 10, 2022 11:15 AM CDT Medicare Annual Well Visit with Anastasia Peralta MD 25 [facility] Karin [place] ([facility])            25 [phone]  [phone]        Last Appointment with provider:  10/4/21  Last appointment at [facility] [place]:  Visit date not found  Cholesterol, Total (mg/dL)   Date Value   10/04/2021 210 (H)     HDL Cholesterol (mg/dL)   Date Value   10/04/2021 42     LDL Cholesterol (mg/dL)   Date Value   10/04/2021 138 (H)     Triglycerides (mg/dL)   Date Value   10/04/2021 169 (H)     No results found for: EAG, A1C  Lab Results   Component Value Date    TSH 1.700 10/04/2021       No follow-ups on file.

## 2022-07-27 ENCOUNTER — TELEPHONE (OUTPATIENT)
Dept: FAMILY MEDICINE CLINIC | Facility: CLINIC | Age: 77
End: 2022-07-27

## 2022-07-27 RX ORDER — ONDANSETRON 4 MG/1
TABLET, FILM COATED ORAL
Qty: 30 TABLET | Refills: 1 | Status: SHIPPED | OUTPATIENT
Start: 2022-07-27

## 2022-07-27 RX ORDER — ALPRAZOLAM 0.5 MG/1
TABLET ORAL
Qty: 90 TABLET | Refills: 0 | Status: SHIPPED | OUTPATIENT
Start: 2022-07-27

## 2022-07-27 NOTE — TELEPHONE ENCOUNTER
Patient states Zofran Denied-  Phone [facility]. It has been taken care of. Zofran is ready for .   Reza Hui, [facility], 07/27/22, 10:58 AM

## 2022-08-03 ENCOUNTER — TELEPHONE (OUTPATIENT)
Dept: FAMILY MEDICINE CLINIC | Facility: CLINIC | Age: 77
End: 2022-08-03

## 2022-08-03 NOTE — TELEPHONE ENCOUNTER
Patient c/o a sore on her head that itches. States she has had it for about 2 weeks. States it is about the size of a quarter. Patient wondering if it could be eczema. States she has not had eczema in the past.  Patient also thought it could be from her shampoo. Has tried switching to head and shoulders to see that helped but it hasn't. Patient also c/o of L arm swelling/lump that she feels is a swollen muscle/bicept. States she was seen by [address] and an ultrasound was done which came back normal.     Appt scheduled for eval (1st available). Patient was not concerned that a sooner appt was needed.      Future Appointments   Date Time Provider Aroldo Lopez   8/8/2022  2:45 PM ALBERT Bridges [facility] [place] [facility] [place]   10/10/2022 11:15 AM Aisha Escobedo MD [facility] [place] [facility]NA[facility] [place]

## 2022-08-03 NOTE — TELEPHONE ENCOUNTER
Agree with an appointment for evaluation with Kristel. Between now and the time of the appointment, she may try over-the-counter hydrocortisone cream for the spot on the head to see if that is helpful with the redness and itching.

## 2022-08-05 DIAGNOSIS — G89.4 CHRONIC PAIN SYNDROME: ICD-10-CM

## 2022-08-05 RX ORDER — TRAMADOL HYDROCHLORIDE 50 MG/1
TABLET ORAL
Qty: 240 TABLET | Refills: 0 | Status: SHIPPED | OUTPATIENT
Start: 2022-08-05

## 2022-08-08 ENCOUNTER — TELEPHONE (OUTPATIENT)
Dept: FAMILY MEDICINE CLINIC | Facility: CLINIC | Age: 77
End: 2022-08-08

## 2022-08-08 NOTE — TELEPHONE ENCOUNTER
Pt calling again and states she wants to reschedule that appt that was cancelled with Kristel but is not sure when she can come in or how long to DUVED. She was lives with someone who came in contact with multiple time in the last 2-3 days. Please advise.

## 2022-08-08 NOTE — TELEPHONE ENCOUNTER
Spoke with patient. She was not exposed to covid but her daughter was exposed. Daughter lives with her. Rescheduled appt per patients request til next week.    Future Appointments   Date Time Provider Aroldo Lopez   8/16/2022 11:00 AM ALBERT Mathias [facility] [place]   10/10/2022 11:15 AM Jackie Dempsey MD [facility] [place]

## 2022-08-08 NOTE — TELEPHONE ENCOUNTER
Patient cancelled today's appt due to family member testing positive for covid. Asking what steps she needs to take now.

## 2022-08-16 ENCOUNTER — OFFICE VISIT (OUTPATIENT)
Dept: FAMILY MEDICINE CLINIC | Facility: CLINIC | Age: 77
End: 2022-08-16
Payer: MEDICARE

## 2022-08-16 VITALS
OXYGEN SATURATION: 99 % | DIASTOLIC BLOOD PRESSURE: 82 MMHG | HEIGHT: 62 IN | HEART RATE: 80 BPM | TEMPERATURE: 98 F | BODY MASS INDEX: 20.8 KG/M2 | WEIGHT: 113 LBS | SYSTOLIC BLOOD PRESSURE: 134 MMHG | RESPIRATION RATE: 20 BRPM

## 2022-08-16 DIAGNOSIS — M79.601 RIGHT ARM PAIN: ICD-10-CM

## 2022-08-16 DIAGNOSIS — L30.9 DERMATITIS: Primary | ICD-10-CM

## 2022-08-16 PROCEDURE — 99214 OFFICE O/P EST MOD 30 MIN: CPT | Performed by: NURSE PRACTITIONER

## 2022-08-16 RX ORDER — MELOXICAM 15 MG/1
15 TABLET ORAL DAILY
Qty: 30 TABLET | Refills: 5 | Status: SHIPPED | OUTPATIENT
Start: 2022-08-16

## 2022-08-16 RX ORDER — KETOCONAZOLE 20 MG/ML
SHAMPOO TOPICAL
Qty: 120 ML | Refills: 3 | Status: SHIPPED | OUTPATIENT
Start: 2022-08-16

## 2022-08-16 NOTE — PATIENT INSTRUCTIONS
Recommend evaluation with orthopaedics for your left arm pain -     Take Meloxicam daily as needed with food for pain - do not take with other NSAIDs (ibuprofen/aleve)     If they do not think that it is related to the shoulder or the injections- then I would recommend evaluation with general surgeon for evaluation of lipoma    Use ketoconazole shampoo  2 times a week for 4 weeks - then as needed    Follow up with dermatology (schedule now)

## 2022-08-17 ENCOUNTER — TELEPHONE (OUTPATIENT)
Dept: FAMILY MEDICINE CLINIC | Facility: CLINIC | Age: 77
End: 2022-08-17

## 2022-08-18 ENCOUNTER — TELEPHONE (OUTPATIENT)
Dept: FAMILY MEDICINE CLINIC | Facility: CLINIC | Age: 77
End: 2022-08-18

## 2022-08-18 NOTE — TELEPHONE ENCOUNTER
Patient spoke with nurse from Dr. Katharine Norman office at her previous ortho at [facility] in [facility], who told her her last gel injection was 4 months ago and unlikely to be cause of her arm swelling now. Patient would be willing to do xray at this office, if indicated, but will not go to [place]. Patient still reports swelling and pain to mid-left upper arm. She is using the prescribed shampoo but will not see dermatologist at this time due to more concerned about arm. Patient states she is unlikely to have surgery due to poor surgical candidate with her advanced age and history of stroke. Patient states any further information needed regarding her ortho history can be obtained from [facility] (Dr. Magdalena Escobar) at [phone].

## 2022-08-18 NOTE — TELEPHONE ENCOUNTER
I would recommend still scheduling derm appointment as it will likely take months- that way if the scalp lesion is  Still there she will have an appointment - otherwise she will have to wait months to get it at that time.    She can do an x-ray here, however, I still would like her to see ortho for her arm pain - see after visit summary from office visit -

## 2022-08-18 NOTE — TELEPHONE ENCOUNTER
Discussed with patient. States she is agreeable to scheduling appointment with derm and with ortho. Will go to ortho for xray.

## 2022-08-22 DIAGNOSIS — G89.4 CHRONIC PAIN SYNDROME: ICD-10-CM

## 2022-08-22 RX ORDER — ALPRAZOLAM 0.5 MG/1
TABLET ORAL
Qty: 90 TABLET | Refills: 0 | Status: SHIPPED | OUTPATIENT
Start: 2022-08-22

## 2022-08-22 NOTE — TELEPHONE ENCOUNTER
Future appt: Your appointments     Date & Time Appointment Department [facility])    Oct 10, 2022 11:15 AM CDT Medicare Annual Well Visit with Jorge Byrd MD 25 [facility], Jad Balderas ([facility])            25 [facility] 1076 [address]  [phone]        Last Appointment with provider:   Visit date not found  Last appointment at [facility]:  8/16/2022  Cholesterol, Total (mg/dL)   Date Value   10/04/2021 210 (H)     HDL Cholesterol (mg/dL)   Date Value   10/04/2021 42     LDL Cholesterol (mg/dL)   Date Value   10/04/2021 138 (H)     Triglycerides (mg/dL)   Date Value   10/04/2021 169 (H)     No results found for: EAG, A1C  Lab Results   Component Value Date    TSH 1.700 10/04/2021     Last RF:  7/27/2022    No follow-ups on file.

## 2022-09-01 DIAGNOSIS — G89.4 CHRONIC PAIN SYNDROME: ICD-10-CM

## 2022-09-02 RX ORDER — TRAMADOL HYDROCHLORIDE 50 MG/1
TABLET ORAL
Qty: 240 TABLET | Refills: 0 | Status: SHIPPED | OUTPATIENT
Start: 2022-09-02

## 2022-09-02 NOTE — TELEPHONE ENCOUNTER
Future appt: Your appointments     Date & Time Appointment Department [facility])    Oct 10, 2022 11:15 AM CDT Medicare Annual Well Visit with Cloyd Schlatter, MD 25 [facility] ([facility])            25 [facility] 1076 [address]  [phone]        Last Appointment with provider:   Visit date not found  Last appointment at [facility] [place]:  8/16/2022  Cholesterol, Total (mg/dL)   Date Value   10/04/2021 210 (H)     HDL Cholesterol (mg/dL)   Date Value   10/04/2021 42     LDL Cholesterol (mg/dL)   Date Value   10/04/2021 138 (H)     Triglycerides (mg/dL)   Date Value   10/04/2021 169 (H)     No results found for: EAG, A1C  Lab Results   Component Value Date    TSH 1.700 10/04/2021     Last RF:  8/5/2022    No follow-ups on file.

## 2022-09-19 ENCOUNTER — TELEPHONE (OUTPATIENT)
Dept: FAMILY MEDICINE CLINIC | Facility: CLINIC | Age: 77
End: 2022-09-19

## 2022-09-19 DIAGNOSIS — M79.602 PAIN OF LEFT UPPER EXTREMITY: Primary | ICD-10-CM

## 2022-09-19 DIAGNOSIS — R22.32 MASS OF LEFT UPPER EXTREMITY: ICD-10-CM

## 2022-09-19 NOTE — TELEPHONE ENCOUNTER
Pt states that she needs an xray for upper left arm, pt states that it's swollen and has pain. Pt states that she spoke with [facility] regarding this issue on her last visit and was told that if it got worse pt could schedule an xray. Needs order. Would like a call back to schedule.

## 2022-09-20 DIAGNOSIS — G89.4 CHRONIC PAIN SYNDROME: ICD-10-CM

## 2022-09-20 RX ORDER — ALPRAZOLAM 0.5 MG/1
TABLET ORAL
Qty: 90 TABLET | Refills: 0 | Status: SHIPPED | OUTPATIENT
Start: 2022-09-20

## 2022-09-20 NOTE — TELEPHONE ENCOUNTER
Future appt: Your appointments     Date & Time Appointment Department [facility])    Oct 10, 2022 11:15 AM CDT Medicare Annual Well Visit with Elena Pratt MD 25 [facility] [place] ([facility])            25 [facility] 1076 [address]  [phone]        Last Appointment with provider:   Visit date not found  Last appointment at [facility]:  8/16/2022    Last refill 8/22/22 #90 with 0 refills      Cholesterol, Total (mg/dL)   Date Value   10/04/2021 210 (H)     HDL Cholesterol (mg/dL)   Date Value   10/04/2021 42     LDL Cholesterol (mg/dL)   Date Value   10/04/2021 138 (H)     Triglycerides (mg/dL)   Date Value   10/04/2021 169 (H)     No results found for: EAG, A1C  Lab Results   Component Value Date    TSH 1.700 10/04/2021       No follow-ups on file.

## 2022-09-20 NOTE — TELEPHONE ENCOUNTER
Patient informed. Patient states she did not see ortho yet. States if something is noted on the xray then she will go. Patient will c/b to schedule the xray.

## 2022-09-21 ENCOUNTER — HOSPITAL ENCOUNTER (OUTPATIENT)
Dept: GENERAL RADIOLOGY | Age: 77
Discharge: HOME | End: 2022-09-21
Attending: NURSE PRACTITIONER

## 2022-09-21 ENCOUNTER — TELEPHONE (OUTPATIENT)
Dept: FAMILY MEDICINE CLINIC | Facility: CLINIC | Age: 77
End: 2022-09-21

## 2022-09-21 DIAGNOSIS — R22.32 MASS OF LEFT UPPER EXTREMITY: ICD-10-CM

## 2022-09-21 DIAGNOSIS — M79.602 PAIN OF LEFT UPPER EXTREMITY: ICD-10-CM

## 2022-09-21 PROCEDURE — 73060 X-RAY EXAM OF HUMERUS: CPT | Performed by: NURSE PRACTITIONER

## 2022-09-21 NOTE — TELEPHONE ENCOUNTER
Patient informed of the below results and recommendations. Patient declines seeing ortho at this time. States she has seen them in the past and they want to do surgery which she doesn't want to do. Patient states she will c/b for results to be sent if she changes her mind.

## 2022-09-21 NOTE — TELEPHONE ENCOUNTER
----- Message from ALBERT Rivas sent at 9/21/2022 11:59 AM CDT -----  Please notify patient that her x-ray shows multiple changes to her shoulder joint which are likely causing her arm pain-also an old, healing fracture to her upper arm-patient should follow-up with Ortho as previously discussed. Please have radiology send x-ray to patient's orthopedic-and fax radiology report.

## 2022-09-21 NOTE — TELEPHONE ENCOUNTER
Patient requesting result of today's xray be sent to Marilia wong [place] in [facility] [address]

## 2022-09-27 DIAGNOSIS — G89.4 CHRONIC PAIN SYNDROME: ICD-10-CM

## 2022-09-28 RX ORDER — TRAMADOL HYDROCHLORIDE 50 MG/1
TABLET ORAL
Qty: 240 TABLET | Refills: 0 | Status: SHIPPED | OUTPATIENT
Start: 2022-09-28

## 2022-09-28 NOTE — TELEPHONE ENCOUNTER
Tramadol: 9/2/22    Future appt: Your appointments     Date & Time Appointment Department [facility])    Oct 10, 2022 11:15 AM CDT Medicare Annual Well Visit with Diane Lees MD [facility]            25 [facility] [place]  [place] [phone]  [phone]        Last Appointment with provider: 10/2021  [facility] appointment at [facility] [place]:  8/16/2022  Cholesterol, Total (mg/dL)   Date Value   10/04/2021 210 (H)     HDL Cholesterol (mg/dL)   Date Value   10/04/2021 42     LDL Cholesterol (mg/dL)   Date Value   10/04/2021 138 (H)     Triglycerides (mg/dL)   Date Value   10/04/2021 169 (H)     No results found for: EAG, A1C  Lab Results   Component Value Date    TSH 1.700 10/04/2021       No follow-ups on file.

## 2022-10-10 ENCOUNTER — OFFICE VISIT (OUTPATIENT)
Dept: FAMILY MEDICINE CLINIC | Facility: CLINIC | Age: 77
End: 2022-10-10
Payer: MEDICARE

## 2022-10-10 ENCOUNTER — LAB ENCOUNTER (OUTPATIENT)
Dept: LAB | Age: 77
End: 2022-10-10
Attending: FAMILY MEDICINE
Payer: MEDICARE

## 2022-10-10 VITALS
DIASTOLIC BLOOD PRESSURE: 78 MMHG | SYSTOLIC BLOOD PRESSURE: 134 MMHG | HEIGHT: 62 IN | RESPIRATION RATE: 16 BRPM | HEART RATE: 100 BPM | BODY MASS INDEX: 20.98 KG/M2 | TEMPERATURE: 98 F | WEIGHT: 114 LBS

## 2022-10-10 DIAGNOSIS — Z98.890 H/O LAMINECTOMY: ICD-10-CM

## 2022-10-10 DIAGNOSIS — Z82.49 FAMILY HISTORY OF ISCHEMIC HEART DISEASE: ICD-10-CM

## 2022-10-10 DIAGNOSIS — I69.351 FLACCID HEMIPLEGIA OF RIGHT DOMINANT SIDE AS LATE EFFECT OF CEREBRAL INFARCTION (HCC): ICD-10-CM

## 2022-10-10 DIAGNOSIS — I10 ESSENTIAL HYPERTENSION: ICD-10-CM

## 2022-10-10 DIAGNOSIS — J43.1 PANLOBULAR EMPHYSEMA (HCC): ICD-10-CM

## 2022-10-10 DIAGNOSIS — F33.1 MODERATE EPISODE OF RECURRENT MAJOR DEPRESSIVE DISORDER (HCC): ICD-10-CM

## 2022-10-10 DIAGNOSIS — Z00.00 ENCOUNTER FOR ANNUAL HEALTH EXAMINATION: Primary | ICD-10-CM

## 2022-10-10 DIAGNOSIS — M47.816 LUMBAR SPONDYLOSIS: ICD-10-CM

## 2022-10-10 DIAGNOSIS — E78.49 FAMILIAL MULTIPLE LIPOPROTEIN-TYPE HYPERLIPIDEMIA: ICD-10-CM

## 2022-10-10 DIAGNOSIS — M81.0 AGE-RELATED OSTEOPOROSIS WITHOUT CURRENT PATHOLOGICAL FRACTURE: ICD-10-CM

## 2022-10-10 DIAGNOSIS — F17.200 TOBACCO USE DISORDER: ICD-10-CM

## 2022-10-10 DIAGNOSIS — I73.9 PAD (PERIPHERAL ARTERY DISEASE) (HCC): ICD-10-CM

## 2022-10-10 DIAGNOSIS — R11.0 NAUSEA: ICD-10-CM

## 2022-10-10 DIAGNOSIS — M25.512 CHRONIC LEFT SHOULDER PAIN: ICD-10-CM

## 2022-10-10 DIAGNOSIS — R26.2 DISABILITY OF WALKING: ICD-10-CM

## 2022-10-10 DIAGNOSIS — E46 PROTEIN-CALORIE MALNUTRITION, UNSPECIFIED SEVERITY (HCC): ICD-10-CM

## 2022-10-10 DIAGNOSIS — G89.29 CHRONIC LEFT SHOULDER PAIN: ICD-10-CM

## 2022-10-10 DIAGNOSIS — G89.4 CHRONIC PAIN SYNDROME: ICD-10-CM

## 2022-10-10 DIAGNOSIS — M15.9 PRIMARY OSTEOARTHRITIS INVOLVING MULTIPLE JOINTS: ICD-10-CM

## 2022-10-10 PROBLEM — S42.209A CLOSED FRACTURE OF PROXIMAL END OF HUMERUS: Status: RESOLVED | Noted: 2021-10-04 | Resolved: 2022-10-10

## 2022-10-10 PROBLEM — R63.4 WEIGHT LOSS: Status: RESOLVED | Noted: 2021-10-04 | Resolved: 2022-10-10

## 2022-10-10 PROBLEM — M24.574 CONTRACTURE OF JOINT OF RIGHT FOOT: Status: RESOLVED | Noted: 2020-07-29 | Resolved: 2022-10-10

## 2022-10-10 PROBLEM — M79.10 MYALGIA: Status: RESOLVED | Noted: 2018-06-12 | Resolved: 2022-10-10

## 2022-10-10 PROBLEM — M79.622 LEFT UPPER ARM PAIN: Status: RESOLVED | Noted: 2021-04-29 | Resolved: 2022-10-10

## 2022-10-10 LAB
ALBUMIN SERPL-MCNC: 3.6 G/DL (ref 3.4–5)
ALBUMIN/GLOB SERPL: 1 {RATIO} (ref 1–2)
ALP LIVER SERPL-CCNC: 77 U/L
ALT SERPL-CCNC: 15 U/L
ANION GAP SERPL CALC-SCNC: 5 MMOL/L (ref 0–18)
AST SERPL-CCNC: 15 U/L (ref 15–37)
BASOPHILS # BLD AUTO: 0.08 X10(3) UL (ref 0–0.2)
BASOPHILS NFR BLD AUTO: 0.6 %
BILIRUB SERPL-MCNC: 0.6 MG/DL (ref 0.1–2)
BUN BLD-MCNC: 17 MG/DL (ref 7–18)
CALCIUM BLD-MCNC: 10.1 MG/DL (ref 8.5–10.1)
CHLORIDE SERPL-SCNC: 105 MMOL/L (ref 98–112)
CHOLEST SERPL-MCNC: 188 MG/DL (ref ?–200)
CO2 SERPL-SCNC: 26 MMOL/L (ref 21–32)
CREAT BLD-MCNC: 0.7 MG/DL
EOSINOPHIL # BLD AUTO: 0.21 X10(3) UL (ref 0–0.7)
EOSINOPHIL NFR BLD AUTO: 1.6 %
ERYTHROCYTE [DISTWIDTH] IN BLOOD BY AUTOMATED COUNT: 16.2 %
FASTING PATIENT LIPID ANSWER: NO
FASTING STATUS PATIENT QL REPORTED: NO
GFR SERPLBLD BASED ON 1.73 SQ M-ARVRAT: 89 ML/MIN/1.73M2 (ref 60–?)
GLOBULIN PLAS-MCNC: 3.5 G/DL (ref 2.8–4.4)
GLUCOSE BLD-MCNC: 88 MG/DL (ref 70–99)
HCT VFR BLD AUTO: 40.5 %
HDLC SERPL-MCNC: 52 MG/DL (ref 40–59)
HGB BLD-MCNC: 12.9 G/DL
IMM GRANULOCYTES # BLD AUTO: 0.06 X10(3) UL (ref 0–1)
IMM GRANULOCYTES NFR BLD: 0.5 %
LDLC SERPL CALC-MCNC: 115 MG/DL (ref ?–100)
LYMPHOCYTES # BLD AUTO: 3.36 X10(3) UL (ref 1–4)
LYMPHOCYTES NFR BLD AUTO: 25.6 %
MCH RBC QN AUTO: 33.4 PG (ref 26–34)
MCHC RBC AUTO-ENTMCNC: 31.9 G/DL (ref 31–37)
MCV RBC AUTO: 104.9 FL
MONOCYTES # BLD AUTO: 1.06 X10(3) UL (ref 0.1–1)
MONOCYTES NFR BLD AUTO: 8.1 %
NEUTROPHILS # BLD AUTO: 8.36 X10 (3) UL (ref 1.5–7.7)
NEUTROPHILS # BLD AUTO: 8.36 X10(3) UL (ref 1.5–7.7)
NEUTROPHILS NFR BLD AUTO: 63.6 %
NONHDLC SERPL-MCNC: 136 MG/DL (ref ?–130)
OSMOLALITY SERPL CALC.SUM OF ELEC: 283 MOSM/KG (ref 275–295)
PLATELET # BLD AUTO: 365 10(3)UL (ref 150–450)
POTASSIUM SERPL-SCNC: 4.5 MMOL/L (ref 3.5–5.1)
PROT SERPL-MCNC: 7.1 G/DL (ref 6.4–8.2)
RBC # BLD AUTO: 3.86 X10(6)UL
SODIUM SERPL-SCNC: 136 MMOL/L (ref 136–145)
TRIGL SERPL-MCNC: 117 MG/DL (ref 30–149)
TSI SER-ACNC: 2.08 MIU/ML (ref 0.36–3.74)
URATE SERPL-MCNC: 4.2 MG/DL
VLDLC SERPL CALC-MCNC: 20 MG/DL (ref 0–30)
WBC # BLD AUTO: 13.1 X10(3) UL (ref 4–11)

## 2022-10-10 PROCEDURE — 36415 COLL VENOUS BLD VENIPUNCTURE: CPT

## 2022-10-10 PROCEDURE — 80053 COMPREHEN METABOLIC PANEL: CPT

## 2022-10-10 PROCEDURE — 85025 COMPLETE CBC W/AUTO DIFF WBC: CPT

## 2022-10-10 PROCEDURE — 84550 ASSAY OF BLOOD/URIC ACID: CPT

## 2022-10-10 PROCEDURE — 80061 LIPID PANEL: CPT

## 2022-10-10 PROCEDURE — 84443 ASSAY THYROID STIM HORMONE: CPT

## 2022-10-10 RX ORDER — ONDANSETRON 4 MG/1
4 TABLET, FILM COATED ORAL EVERY 8 HOURS PRN
Qty: 90 TABLET | Refills: 3 | Status: SHIPPED | OUTPATIENT
Start: 2022-10-10

## 2022-10-10 RX ORDER — MELATONIN
400 DAILY
Qty: 90 TABLET | Refills: 3 | Status: SHIPPED | OUTPATIENT
Start: 2022-10-10

## 2022-10-10 RX ORDER — BENAZEPRIL HYDROCHLORIDE 40 MG/1
40 TABLET, FILM COATED ORAL DAILY
Qty: 90 TABLET | Refills: 3 | Status: SHIPPED | OUTPATIENT
Start: 2022-10-10

## 2022-10-10 RX ORDER — BUDESONIDE AND FORMOTEROL FUMARATE DIHYDRATE 160; 4.5 UG/1; UG/1
2 AEROSOL RESPIRATORY (INHALATION) 2 TIMES DAILY
Qty: 30.6 G | Refills: 3 | Status: SHIPPED | OUTPATIENT
Start: 2022-10-10

## 2022-10-11 ENCOUNTER — TELEPHONE (OUTPATIENT)
Dept: FAMILY MEDICINE CLINIC | Facility: CLINIC | Age: 77
End: 2022-10-11

## 2022-10-11 NOTE — TELEPHONE ENCOUNTER
----- Message from Nicolas Oconnell MD sent at 10/11/2022  9:44 AM CDT -----  Good news on the blood work. Thyroid (TSH) is normal.  Blood glucose is normal so no signs of diabetes. Kidney function is excellent. Uric acid is normal.  Cholesterol is normal at 188. Hemoglobin is normal at 12.9. White blood count is slightly elevated at 13.1. There can be many reasons for this but in this case it does not seem to be significant. Impression: Overall, the labs look great. Plan: No changes recommended now.

## 2022-10-18 DIAGNOSIS — G89.4 CHRONIC PAIN SYNDROME: ICD-10-CM

## 2022-10-18 RX ORDER — ALPRAZOLAM 0.5 MG/1
TABLET ORAL
Qty: 90 TABLET | Refills: 0 | Status: SHIPPED | OUTPATIENT
Start: 2022-10-18

## 2022-10-18 NOTE — TELEPHONE ENCOUNTER
ALPRAZOLAM 0.5 MG Oral Tab     #90  R-0       Summary: TAKE 1 TABLET(0.5 MG) BY MOUTH THREE TIMES DAILY AS NEEDED FOR ANXIETY        Last Refill- 9/20/22      Future appt:    Last Appointment with provider:   10/10/2022  [facility]:  10/10/2022  Cholesterol, Total (mg/dL)   Date Value   10/10/2022 188     HDL Cholesterol (mg/dL)   Date Value   10/10/2022 52     LDL Cholesterol (mg/dL)   Date Value   10/10/2022 115 (H)     Triglycerides (mg/dL)   Date Value   10/10/2022 117     No results found for: EAG, A1C  Lab Results   Component Value Date    TSH 2.080 10/10/2022       No follow-ups on file.

## 2022-10-26 DIAGNOSIS — G89.4 CHRONIC PAIN SYNDROME: ICD-10-CM

## 2022-10-26 NOTE — TELEPHONE ENCOUNTER
Future appt:     Last Appointment with provider:   10/10/2022; Return in 1 year (on 10/10/2023). Last appointment at [facility] [place]:  10/10/2022  Cholesterol, Total (mg/dL)   Date Value   10/10/2022 188     HDL Cholesterol (mg/dL)   Date Value   10/10/2022 52     LDL Cholesterol (mg/dL)   Date Value   10/10/2022 115 (H)     Triglycerides (mg/dL)   Date Value   10/10/2022 117     No results found for: EAG, A1C  Lab Results   Component Value Date    TSH 2.080 10/10/2022     Last RF:  9/28/2022    No follow-ups on file.

## 2022-10-27 RX ORDER — TRAMADOL HYDROCHLORIDE 50 MG/1
TABLET ORAL
Qty: 240 TABLET | Refills: 0 | Status: SHIPPED | OUTPATIENT
Start: 2022-10-27

## 2022-11-10 ENCOUNTER — TELEPHONE (OUTPATIENT)
Dept: FAMILY MEDICINE CLINIC | Facility: CLINIC | Age: 77
End: 2022-11-10

## 2022-11-10 NOTE — TELEPHONE ENCOUNTER
Pt calling stating that Tramadol is not helping. Pt states that medication is not making any difference and cannot lie down for 2 days. Pt does not get another cortisone injection until December. Pt would like something with codeine, but not Tylenol with codeine.       Please call and advise     Thank you

## 2022-11-10 NOTE — TELEPHONE ENCOUNTER
Pt states she will not follow up with ortho until she needs her shot and no to pain clinic. States she will wait for dr. Bernardo Moon recommendations.  States she did take empirin with codeine in past.

## 2022-11-10 NOTE — TELEPHONE ENCOUNTER
Dr. Gloria Bang out of the office until tomorrow. I'm assuming the patient is talking about left shoulder pain? Not specified in the phone note. Dr. Gloria Bang is not back in the office until tomorrow, if the pain is this severe and managed by ortho specialist she should reach out to them regarding any additional intervention options or narcotic prescriptions as already receiving tramadol from our office. Additionally, I could provide the patient a referral to the pain clinic for further pain management options.

## 2022-11-11 RX ORDER — BENAZEPRIL HYDROCHLORIDE 40 MG/1
TABLET, FILM COATED ORAL
Qty: 90 TABLET | Refills: 3 | OUTPATIENT
Start: 2022-11-11

## 2022-11-11 NOTE — TELEPHONE ENCOUNTER
Patient informed of below. Expressed understanding.   Misha Marino, [facility], 11/11/22, 12:55 PM

## 2022-11-11 NOTE — TELEPHONE ENCOUNTER
I am sorry that the tramadol is no longer effective for the pain. Unfortunately, there is not anything else that we can prescribe that would be more effective at this time. Please continue to try to be out and about as much as possible as this does seem to be helpful with the pain.

## 2022-11-14 DIAGNOSIS — G89.4 CHRONIC PAIN SYNDROME: ICD-10-CM

## 2022-11-14 RX ORDER — ALPRAZOLAM 0.5 MG/1
TABLET ORAL
Qty: 90 TABLET | Refills: 0 | Status: SHIPPED | OUTPATIENT
Start: 2022-11-14

## 2022-11-14 NOTE — TELEPHONE ENCOUNTER
Alprazolam: 10/18/22    Future appt:    Last Appointment with provider:   10/10/2022  Last appointment at [facility]:  10/10/2022  Cholesterol, Total (mg/dL)   Date Value   10/10/2022 188     HDL Cholesterol (mg/dL)   Date Value   10/10/2022 52     LDL Cholesterol (mg/dL)   Date Value   10/10/2022 115 (H)     Triglycerides (mg/dL)   Date Value   10/10/2022 117     No results found for: EAG, A1C  Lab Results   Component Value Date    TSH 2.080 10/10/2022       No follow-ups on file.

## 2022-11-23 DIAGNOSIS — G89.4 CHRONIC PAIN SYNDROME: ICD-10-CM

## 2022-11-23 RX ORDER — TRAMADOL HYDROCHLORIDE 50 MG/1
TABLET ORAL
Qty: 240 TABLET | Refills: 0 | Status: SHIPPED | OUTPATIENT
Start: 2022-11-23

## 2022-11-23 NOTE — TELEPHONE ENCOUNTER
Tramadol: 10/27/22    Future appt:    Last Appointment with provider:   Visit date not found  Last appointment at [facility]:  10/10/2022  / PCP    Cholesterol, Total (mg/dL)   Date Value   10/10/2022 188     HDL Cholesterol (mg/dL)   Date Value   10/10/2022 52     LDL Cholesterol (mg/dL)   Date Value   10/10/2022 115 (H)     Triglycerides (mg/dL)   Date Value   10/10/2022 117     No results found for: EAG, A1C  Lab Results   Component Value Date    TSH 2.080 10/10/2022       No follow-ups on file. no

## 2022-12-11 DIAGNOSIS — G89.4 CHRONIC PAIN SYNDROME: ICD-10-CM

## 2022-12-12 RX ORDER — ALPRAZOLAM 0.5 MG/1
TABLET ORAL
Qty: 90 TABLET | Refills: 0 | Status: SHIPPED | OUTPATIENT
Start: 2022-12-12

## 2022-12-20 DIAGNOSIS — G89.4 CHRONIC PAIN SYNDROME: ICD-10-CM

## 2022-12-20 RX ORDER — TRAMADOL HYDROCHLORIDE 50 MG/1
TABLET ORAL
Qty: 240 TABLET | Refills: 0 | Status: SHIPPED | OUTPATIENT
Start: 2022-12-20

## 2022-12-20 NOTE — TELEPHONE ENCOUNTER
Tramadol: 11/23/22    Future appt:    Last Appointment with provider:   10/10/2022  Last appointment at [facility]:  10/10/2022  Cholesterol, Total (mg/dL)   Date Value   10/10/2022 188     HDL Cholesterol (mg/dL)   Date Value   10/10/2022 52     LDL Cholesterol (mg/dL)   Date Value   10/10/2022 115 (H)     Triglycerides (mg/dL)   Date Value   10/10/2022 117     No results found for: EAG, A1C  Lab Results   Component Value Date    TSH 2.080 10/10/2022       No follow-ups on file.

## 2022-12-27 RX ORDER — BUDESONIDE AND FORMOTEROL FUMARATE DIHYDRATE 160; 4.5 UG/1; UG/1
AEROSOL RESPIRATORY (INHALATION)
Qty: 30.6 G | Refills: 5 | Status: SHIPPED | OUTPATIENT
Start: 2022-12-27

## 2022-12-27 NOTE — TELEPHONE ENCOUNTER
Symbicort: 10/10/22    Future appt:    Last Appointment with provider:   10/10/2022  Last appointment at [facility]:  10/10/2022  Cholesterol, Total (mg/dL)   Date Value   10/10/2022 188     HDL Cholesterol (mg/dL)   Date Value   10/10/2022 52     LDL Cholesterol (mg/dL)   Date Value   10/10/2022 115 (H)     Triglycerides (mg/dL)   Date Value   10/10/2022 117     No results found for: EAG, A1C  Lab Results   Component Value Date    TSH 2.080 10/10/2022       No follow-ups on file.

## 2023-01-08 DIAGNOSIS — G89.4 CHRONIC PAIN SYNDROME: ICD-10-CM

## 2023-01-09 RX ORDER — ALPRAZOLAM 0.5 MG/1
TABLET ORAL
Qty: 90 TABLET | Refills: 0 | Status: SHIPPED | OUTPATIENT
Start: 2023-01-09

## 2023-01-09 NOTE — TELEPHONE ENCOUNTER
Alprazolam: 12/12/22    Future appt:    Last Appointment with provider:   10/10/2022  Last appointment at [facility]:  10/10/2022  Cholesterol, Total (mg/dL)   Date Value   10/10/2022 188     HDL Cholesterol (mg/dL)   Date Value   10/10/2022 52     LDL Cholesterol (mg/dL)   Date Value   10/10/2022 115 (H)     Triglycerides (mg/dL)   Date Value   10/10/2022 117     No results found for: EAG, A1C  Lab Results   Component Value Date    TSH 2.080 10/10/2022       No follow-ups on file.

## 2023-01-16 DIAGNOSIS — G89.4 CHRONIC PAIN SYNDROME: ICD-10-CM

## 2023-01-16 RX ORDER — TRAMADOL HYDROCHLORIDE 50 MG/1
TABLET ORAL
Qty: 240 TABLET | Refills: 0 | Status: SHIPPED | OUTPATIENT
Start: 2023-01-16

## 2023-01-16 NOTE — TELEPHONE ENCOUNTER
Tramadol: 12/20/22    Future appt:    Last Appointment with provider:   10/10/2022  Last appointment at [facility]:  10/10/2022  Cholesterol, Total (mg/dL)   Date Value   10/10/2022 188     HDL Cholesterol (mg/dL)   Date Value   10/10/2022 52     LDL Cholesterol (mg/dL)   Date Value   10/10/2022 115 (H)     Triglycerides (mg/dL)   Date Value   10/10/2022 117     No results found for: EAG, A1C  Lab Results   Component Value Date    TSH 2.080 10/10/2022       No follow-ups on file.

## 2023-02-05 DIAGNOSIS — G89.4 CHRONIC PAIN SYNDROME: ICD-10-CM

## 2023-02-06 RX ORDER — ALPRAZOLAM 0.5 MG/1
TABLET ORAL
Qty: 90 TABLET | Refills: 0 | Status: SHIPPED | OUTPATIENT
Start: 2023-02-06

## 2023-02-06 NOTE — TELEPHONE ENCOUNTER
Alprazolam: 1/9/23    Future appt:    Last Appointment with provider:   10/10/2022  Last appointment at [facility]:  10/10/2022  Cholesterol, Total (mg/dL)   Date Value   10/10/2022 188     HDL Cholesterol (mg/dL)   Date Value   10/10/2022 52     LDL Cholesterol (mg/dL)   Date Value   10/10/2022 115 (H)     Triglycerides (mg/dL)   Date Value   10/10/2022 117     No results found for: EAG, A1C  Lab Results   Component Value Date    TSH 2.080 10/10/2022       No follow-ups on file.

## 2023-02-12 DIAGNOSIS — G89.4 CHRONIC PAIN SYNDROME: ICD-10-CM

## 2023-02-13 RX ORDER — TRAMADOL HYDROCHLORIDE 50 MG/1
TABLET ORAL
Qty: 240 TABLET | Refills: 0 | Status: SHIPPED | OUTPATIENT
Start: 2023-02-13

## 2023-02-13 NOTE — TELEPHONE ENCOUNTER
Tramadol: 1/16/23    Future appt:    Last Appointment with provider:   10/10/2022  Last appointment at [facility]:  10/10/2022  Cholesterol, Total (mg/dL)   Date Value   10/10/2022 188     HDL Cholesterol (mg/dL)   Date Value   10/10/2022 52     LDL Cholesterol (mg/dL)   Date Value   10/10/2022 115 (H)     Triglycerides (mg/dL)   Date Value   10/10/2022 117     No results found for: EAG, A1C  Lab Results   Component Value Date    TSH 2.080 10/10/2022       No follow-ups on file.

## 2023-03-02 DIAGNOSIS — G89.4 CHRONIC PAIN SYNDROME: ICD-10-CM

## 2023-03-03 RX ORDER — ALPRAZOLAM 0.5 MG/1
TABLET ORAL
Qty: 90 TABLET | Refills: 0 | Status: SHIPPED | OUTPATIENT
Start: 2023-03-03

## 2023-03-03 NOTE — TELEPHONE ENCOUNTER
Last refill 2/6/23 #90 w/0 refills    Future appt:    Last Appointment with provider:   10/10/2022   Last appointment at [facility]:  Medicare 10/10/2022Return in 1 year (on 10/10/2023). Cholesterol, Total (mg/dL)   Date Value   10/10/2022 188     HDL Cholesterol (mg/dL)   Date Value   10/10/2022 52     LDL Cholesterol (mg/dL)   Date Value   10/10/2022 115 (H)     Triglycerides (mg/dL)   Date Value   10/10/2022 117     No results found for: EAG, A1C  Lab Results   Component Value Date    TSH 2.080 10/10/2022       No follow-ups on file.

## 2023-03-09 DIAGNOSIS — G89.4 CHRONIC PAIN SYNDROME: ICD-10-CM

## 2023-03-10 RX ORDER — TRAMADOL HYDROCHLORIDE 50 MG/1
TABLET ORAL
Qty: 240 TABLET | Refills: 0 | Status: SHIPPED | OUTPATIENT
Start: 2023-03-10

## 2023-03-10 NOTE — TELEPHONE ENCOUNTER
Tramadol: 2/13/23    Future appt:    Last Appointment with provider:   10/10/2022  Last appointment at [facility]:  10/10/2022  Cholesterol, Total (mg/dL)   Date Value   10/10/2022 188     HDL Cholesterol (mg/dL)   Date Value   10/10/2022 52     LDL Cholesterol (mg/dL)   Date Value   10/10/2022 115 (H)     Triglycerides (mg/dL)   Date Value   10/10/2022 117     No results found for: EAG, A1C  Lab Results   Component Value Date    TSH 2.080 10/10/2022       No follow-ups on file.

## 2023-03-28 ENCOUNTER — TELEPHONE (OUTPATIENT)
Dept: FAMILY MEDICINE CLINIC | Facility: CLINIC | Age: 78
End: 2023-03-28

## 2023-03-28 RX ORDER — ALBUTEROL SULFATE 90 UG/1
AEROSOL, METERED RESPIRATORY (INHALATION)
Qty: 90 G | Refills: 0 | OUTPATIENT
Start: 2023-03-28

## 2023-03-28 RX ORDER — ALBUTEROL SULFATE 90 UG/1
2 AEROSOL, METERED RESPIRATORY (INHALATION) EVERY 4 HOURS PRN
Qty: 3 EACH | Refills: 0 | Status: SHIPPED | OUTPATIENT
Start: 2023-03-28

## 2023-03-28 RX ORDER — ALBUTEROL SULFATE 90 UG/1
2 AEROSOL, METERED RESPIRATORY (INHALATION) EVERY 4 HOURS PRN
COMMUNITY
End: 2023-03-28

## 2023-03-28 NOTE — TELEPHONE ENCOUNTER
Name [facility] has been discontinued. Confirmed with [employer]. Patient will need Rx for Generic Albuterol.   Blake Corrales CMA, 03/28/23, 8:47 AM

## 2023-03-30 DIAGNOSIS — G89.4 CHRONIC PAIN SYNDROME: ICD-10-CM

## 2023-03-30 NOTE — TELEPHONE ENCOUNTER
Future appt: Your appointments     Date & Time Appointment Department [facility])    Oct 10, 2023  3:00 PM CDT Medicare Annual Well Visit with MD Celia Cruz Inis Quirk ([place])            [address]  [address] [phone]  [phone]        Last Appointment with provider:   10/10/2022  Last appointment at [facility]:  10/10/2022  Cholesterol, Total (mg/dL)   Date Value   10/10/2022 188     HDL Cholesterol (mg/dL)   Date Value   10/10/2022 52     LDL Cholesterol (mg/dL)   Date Value   10/10/2022 115 (H)     Triglycerides (mg/dL)   Date Value   10/10/2022 117     No results found for: EAG, A1C  Lab Results   Component Value Date    TSH 2.080 10/10/2022     Last RF:  3/3/2023    No follow-ups on file.

## 2023-03-31 RX ORDER — ALPRAZOLAM 0.5 MG/1
TABLET ORAL
Qty: 90 TABLET | Refills: 0 | Status: SHIPPED | OUTPATIENT
Start: 2023-03-31

## 2023-04-05 ENCOUNTER — TELEPHONE (OUTPATIENT)
Dept: FAMILY MEDICINE CLINIC | Facility: CLINIC | Age: 78
End: 2023-04-05

## 2023-04-05 RX ORDER — FLUTICASONE FUROATE AND VILANTEROL 200; 25 UG/1; UG/1
1 POWDER RESPIRATORY (INHALATION) DAILY
Qty: 60 EACH | Refills: 3 | Status: SHIPPED | OUTPATIENT
Start: 2023-04-05 | End: 2023-04-06

## 2023-04-05 RX ORDER — BUDESONIDE AND FORMOTEROL FUMARATE DIHYDRATE 160; 4.5 UG/1; UG/1
2 AEROSOL RESPIRATORY (INHALATION) 2 TIMES DAILY
Qty: 30.6 G | Refills: 5 | Status: SHIPPED | OUTPATIENT
Start: 2023-04-05 | End: 2023-04-05

## 2023-04-05 NOTE — TELEPHONE ENCOUNTER
Symbicort: 12/27/27  Return in 1 year (on 10/10/2023). Future appt: Your appointments     Date & Time Appointment Department [facility])    Oct 10, 2023  3:00 PM CDT Medicare Annual Well Visit with MD Libby Arango Vannie Hatter ([place])            [address]  [address] [phone]  [phone]        Last Appointment with provider:   10/10/2022  Last appointment at [facility]:  10/10/2022  Cholesterol, Total (mg/dL)   Date Value   10/10/2022 188     HDL Cholesterol (mg/dL)   Date Value   10/10/2022 52     LDL Cholesterol (mg/dL)   Date Value   10/10/2022 115 (H)     Triglycerides (mg/dL)   Date Value   10/10/2022 117     No results found for: EAG, A1C  Lab Results   Component Value Date    TSH 2.080 10/10/2022       No follow-ups on file.

## 2023-04-05 NOTE — TELEPHONE ENCOUNTER
Please advise change in Symbicort due to  Medication back order.   Otilio Oquendo CMA, 04/05/23, 10:36 AM

## 2023-04-06 ENCOUNTER — TELEPHONE (OUTPATIENT)
Dept: FAMILY MEDICINE CLINIC | Facility: CLINIC | Age: 78
End: 2023-04-06

## 2023-04-06 RX ORDER — FLUTICASONE FUROATE AND VILANTEROL 200; 25 UG/1; UG/1
1 POWDER RESPIRATORY (INHALATION) DAILY
Qty: 60 EACH | Refills: 3 | Status: SHIPPED | OUTPATIENT
Start: 2023-04-06

## 2023-04-06 NOTE — TELEPHONE ENCOUNTER
Kaelyn Melvin is on back order- needs a rx for a generic inhaler per pharmacy so that they can dispense same day - has been without an inhaler for over a week

## 2023-04-06 NOTE — TELEPHONE ENCOUNTER
Called pharmacy states they done have name brand in stock. Only alternative for breo is the generic and pt will have to pay out of pocket for generic($384, with goodRX $160). Pt cant aford that. Only other option is to call around to see who has name brand Breo in stock and sending it there. Pt will either wait for it to be in stock at current pharm or will call us back with new pharm.

## 2023-04-07 NOTE — TELEPHONE ENCOUNTER
Phoned [facility]--  They have ordered the name brand Breo. Patient cost approx $15.    Patient informed the Name Brand Inhaler  Maybe in today or Monday to [facility]/agreed.

## 2023-04-10 DIAGNOSIS — G89.4 CHRONIC PAIN SYNDROME: ICD-10-CM

## 2023-04-10 RX ORDER — TRAMADOL HYDROCHLORIDE 50 MG/1
TABLET ORAL
Qty: 240 TABLET | Refills: 0 | Status: SHIPPED | OUTPATIENT
Start: 2023-04-10

## 2023-04-10 NOTE — TELEPHONE ENCOUNTER
Tramadol: 3/10/23    Future appt: Your appointments     Date & Time Appointment Department [facility])    Oct 10, 2023  3:00 PM CDT Medicare Annual Well Visit with MD Oma Bush Blondell Calandra ([facility])            Oma Mccarthy, [facility] [place]  [address] [phone]  [phone]        Last Appointment with provider:   Visit date not found  Last appointment at [facility] [place]:  Visit date not found  Cholesterol, Total (mg/dL)   Date Value   10/10/2022 188     HDL Cholesterol (mg/dL)   Date Value   10/10/2022 52     LDL Cholesterol (mg/dL)   Date Value   10/10/2022 115 (H)     Triglycerides (mg/dL)   Date Value   10/10/2022 117     No results found for: EAG, A1C  Lab Results   Component Value Date    TSH 2.080 10/10/2022       No follow-ups on file.

## 2023-04-26 DIAGNOSIS — G89.4 CHRONIC PAIN SYNDROME: ICD-10-CM

## 2023-04-26 RX ORDER — ALPRAZOLAM 0.5 MG/1
TABLET ORAL
Qty: 90 TABLET | Refills: 0 | Status: SHIPPED | OUTPATIENT
Start: 2023-04-26

## 2023-04-26 NOTE — TELEPHONE ENCOUNTER
Alprazolam:3/31/23    Future appt: Your appointments     Date & Time Appointment Department [facility])    Oct 10, 2023  3:00 PM CDT Medicare Annual Well Visit with MD Fidencio Villa, Kostas Monae ([facility])            Fidencio Hines, [facility]  [address] [phone]  [phone]        Last Appointment with provider:   Visit date not found  [facility]:  Visit date not found  Cholesterol, Total (mg/dL)   Date Value   10/10/2022 188     HDL Cholesterol (mg/dL)   Date Value   10/10/2022 52     LDL Cholesterol (mg/dL)   Date Value   10/10/2022 115 (H)     Triglycerides (mg/dL)   Date Value   10/10/2022 117     No results found for: EAG, A1C  Lab Results   Component Value Date    TSH 2.080 10/10/2022       No follow-ups on file.

## 2023-05-01 ENCOUNTER — TELEPHONE (OUTPATIENT)
Dept: FAMILY MEDICINE CLINIC | Facility: CLINIC | Age: 78
End: 2023-05-01

## 2023-05-01 DIAGNOSIS — J43.1 PANLOBULAR EMPHYSEMA (HCC): Primary | ICD-10-CM

## 2023-05-01 DIAGNOSIS — F17.200 TOBACCO USE DISORDER: ICD-10-CM

## 2023-05-01 RX ORDER — BUDESONIDE AND FORMOTEROL FUMARATE DIHYDRATE 160; 4.5 UG/1; UG/1
2 AEROSOL RESPIRATORY (INHALATION) 2 TIMES DAILY
Qty: 10.2 G | Refills: 0 | Status: SHIPPED | OUTPATIENT
Start: 2023-05-01

## 2023-05-01 RX ORDER — BUDESONIDE AND FORMOTEROL FUMARATE DIHYDRATE 160; 4.5 UG/1; UG/1
2 AEROSOL RESPIRATORY (INHALATION) 2 TIMES DAILY
Qty: 1 EACH | Refills: 2 | Status: CANCELLED | OUTPATIENT
Start: 2023-05-01

## 2023-05-01 RX ORDER — BUDESONIDE AND FORMOTEROL FUMARATE DIHYDRATE 160; 4.5 UG/1; UG/1
2 AEROSOL RESPIRATORY (INHALATION) 2 TIMES DAILY
COMMUNITY
End: 2023-05-01

## 2023-05-01 NOTE — TELEPHONE ENCOUNTER
Patient does not care for the [facility]. Does not feel she is using it correctly and  Asking that the Symbicort Name Brand to be   Tried at the pharmacy again. Phoned Ranjan L[address]---  Name Brand Symbicort on back order. [facility] has 3 in stock. Patient requesting Symbicort Name Brand  To [facility].

## 2023-05-05 DIAGNOSIS — G89.4 CHRONIC PAIN SYNDROME: ICD-10-CM

## 2023-05-05 RX ORDER — TRAMADOL HYDROCHLORIDE 50 MG/1
TABLET ORAL
Qty: 240 TABLET | Refills: 0 | OUTPATIENT
Start: 2023-05-05

## 2023-05-05 NOTE — TELEPHONE ENCOUNTER
Tramadol: 4/10/23  Chronic Pain. Future appt: Your appointments     Date & Time Appointment Department [facility])    Oct 10, 2023  3:00 PM CDT Medicare Annual Well Visit with Jacki Cooper MD [address], [place] ([place])            [address], [facility]  [address] [phone]  [phone]        Last Appointment with provider:  10/10/22  Last appointment at [facility]:  Visit date not found  Cholesterol, Total (mg/dL)   Date Value   10/10/2022 188     HDL Cholesterol (mg/dL)   Date Value   10/10/2022 52     LDL Cholesterol (mg/dL)   Date Value   10/10/2022 115 (H)     Triglycerides (mg/dL)   Date Value   10/10/2022 117     No results found for: EAG, A1C  Lab Results   Component Value Date    TSH 2.080 10/10/2022       No follow-ups on file.

## 2023-05-05 NOTE — TELEPHONE ENCOUNTER
Refill declined. Last filled 04/10/23 according to [facility] . Too soon for refill, next available 05/10/23.

## 2023-05-08 ENCOUNTER — TELEPHONE (OUTPATIENT)
Dept: FAMILY MEDICINE CLINIC | Facility: CLINIC | Age: 78
End: 2023-05-08

## 2023-05-08 DIAGNOSIS — G89.4 CHRONIC PAIN SYNDROME: ICD-10-CM

## 2023-05-08 DIAGNOSIS — R11.0 NAUSEA: ICD-10-CM

## 2023-05-08 RX ORDER — TRAMADOL HYDROCHLORIDE 50 MG/1
100 TABLET ORAL EVERY 6 HOURS PRN
Qty: 240 TABLET | Refills: 0 | Status: SHIPPED | OUTPATIENT
Start: 2023-05-08

## 2023-05-08 NOTE — TELEPHONE ENCOUNTER
Patient calling due to not being able to   Tramadol until Wednesday. Per [facility] [place]---  Patient was given 30day supply 4/10. Cannot  rx until 5/10. Patient informed of above. Expressed understanding.   Donald Kam CMA, 05/08/23, 12:59 PM

## 2023-05-24 DIAGNOSIS — G89.4 CHRONIC PAIN SYNDROME: ICD-10-CM

## 2023-05-24 RX ORDER — ALPRAZOLAM 0.5 MG/1
TABLET ORAL
Qty: 90 TABLET | Refills: 1 | Status: SHIPPED | OUTPATIENT
Start: 2023-05-24

## 2023-05-24 NOTE — TELEPHONE ENCOUNTER
Last appt 10/10/22 advised Return in 1 year (on 10/10/2023).         Future Appointments   Date Time Provider Aroldo Lopez   10/10/2023  3:00 PM Doyle Garzon MD [facility] [place] [facility]

## 2023-05-26 ENCOUNTER — TELEPHONE (OUTPATIENT)
Dept: FAMILY MEDICINE CLINIC | Facility: CLINIC | Age: 78
End: 2023-05-26

## 2023-05-26 NOTE — TELEPHONE ENCOUNTER
My mom had her cortisone injection in the shoulder from Dr. Leroy De Guzman at [address] [place] [address].

## 2023-05-26 NOTE — TELEPHONE ENCOUNTER
Pt wants to know where did Dr. Carlos Arteaga mom get her cortisone injections from?   Future Appointments   Date Time Provider Aroldo Lopez   10/10/2023  3:00 PM Oxana Wu MD [facility] [place] [facility]

## 2023-06-07 DIAGNOSIS — G89.4 CHRONIC PAIN SYNDROME: ICD-10-CM

## 2023-06-07 RX ORDER — TRAMADOL HYDROCHLORIDE 50 MG/1
100 TABLET ORAL EVERY 6 HOURS PRN
Qty: 240 TABLET | Refills: 0 | Status: SHIPPED | OUTPATIENT
Start: 2023-06-07

## 2023-06-07 NOTE — TELEPHONE ENCOUNTER
Future appt: Your appointments     Date & Time Appointment Department [facility])    Oct 10, 2023  3:00 PM CDT Medicare Annual Well Visit with Vidal Shi MD [address], [place] Cast[place])            [address], [facility]  [address] [phone]  [phone]        Last Appointment with provider:   Visit date not found  Last appointment at [facility]:  Visit date not found  Cholesterol, Total (mg/dL)   Date Value   10/10/2022 188     HDL Cholesterol (mg/dL)   Date Value   10/10/2022 52     LDL Cholesterol (mg/dL)   Date Value   10/10/2022 115 (H)     Triglycerides (mg/dL)   Date Value   10/10/2022 117     No results found for: EAG, A1C  Lab Results   Component Value Date    TSH 2.080 10/10/2022     Last RF:  5/8/2023    No follow-ups on file.

## 2023-07-03 DIAGNOSIS — G89.4 CHRONIC PAIN SYNDROME: ICD-10-CM

## 2023-07-03 RX ORDER — TRAMADOL HYDROCHLORIDE 50 MG/1
100 TABLET ORAL EVERY 6 HOURS PRN
Qty: 240 TABLET | Refills: 0 | Status: SHIPPED | OUTPATIENT
Start: 2023-07-03

## 2023-07-24 DIAGNOSIS — G89.4 CHRONIC PAIN SYNDROME: ICD-10-CM

## 2023-07-24 RX ORDER — ALPRAZOLAM 0.5 MG/1
0.5 TABLET ORAL 3 TIMES DAILY PRN
Qty: 90 TABLET | Refills: 0 | Status: SHIPPED | OUTPATIENT
Start: 2023-07-24

## 2023-07-24 NOTE — TELEPHONE ENCOUNTER
Last refill - 5/24/23 #90 w/1 refill    Future appt: Your appointments       Date & Time Appointment Department [facility])    Oct 10, 2023  3:00 PM CDT Medicare Annual Well Visit with Prieto Wang MD [address] [place] [address], Jordan Morris ([place])              [address] [place] [address] [facility] [address] [phone]  [phone]            Last Appointment with provider: 10/10/22 - px    Last appointment at [facility]:  Visit date not found    Cholesterol, Total (mg/dL)   Date Value   10/10/2022 188     HDL Cholesterol (mg/dL)   Date Value   10/10/2022 52     LDL Cholesterol (mg/dL)   Date Value   10/10/2022 115 (H)     Triglycerides (mg/dL)   Date Value   10/10/2022 117     No results found for: EAG, A1C  Lab Results   Component Value Date    TSH 2.080 10/10/2022       No follow-ups on file.

## 2023-08-02 ENCOUNTER — TELEPHONE (OUTPATIENT)
Dept: FAMILY MEDICINE CLINIC | Facility: CLINIC | Age: 78
End: 2023-08-02

## 2023-08-02 DIAGNOSIS — G89.4 CHRONIC PAIN SYNDROME: ICD-10-CM

## 2023-08-02 RX ORDER — ACETAMINOPHEN AND CODEINE PHOSPHATE 300; 30 MG/1; MG/1
1 TABLET ORAL EVERY 4 HOURS PRN
Qty: 20 TABLET | Refills: 0 | Status: SHIPPED | OUTPATIENT
Start: 2023-08-02

## 2023-08-02 RX ORDER — TRAMADOL HYDROCHLORIDE 50 MG/1
100 TABLET ORAL EVERY 6 HOURS PRN
Qty: 240 TABLET | Refills: 0 | Status: SHIPPED | OUTPATIENT
Start: 2023-08-02

## 2023-08-02 NOTE — TELEPHONE ENCOUNTER
Pharmacy called and needed DX code for Tylenol-3    DX code G89.4 (chronic pain syndrome) given    No further questions

## 2023-08-02 NOTE — TELEPHONE ENCOUNTER
Patient informed of below. Expressed understanding.   Leobardo Wiggins, [facility], 08/02/23, 4:05 PM

## 2023-08-02 NOTE — TELEPHONE ENCOUNTER
Tramadol:  7/3/23    Future appt: Your appointments       Date & Time Appointment Department [facility])    Oct 10, 2023  3:00 PM CDT Medicare Annual Well Visit with Dylan Saini MD [address] [place] [address], Adam Ferrara ([facility])              [address] [place] [address] [facility]  [address] [phone]  [phone]          Last Appointment with provider:   Visit date not found  Last appointment at [facility]:  Visit date not found  Cholesterol, Total (mg/dL)   Date Value   10/10/2022 188     HDL Cholesterol (mg/dL)   Date Value   10/10/2022 52     LDL Cholesterol (mg/dL)   Date Value   10/10/2022 115 (H)     Triglycerides (mg/dL)   Date Value   10/10/2022 117     No results found for: EAG, A1C  Lab Results   Component Value Date    TSH 2.080 10/10/2022       No follow-ups on file.

## 2023-08-02 NOTE — TELEPHONE ENCOUNTER
Unfortunately tramadol is out of stock. We can try Tylenol with codeine although there has been some with nausea and vomiting associated with that in the past.  At this time we do not have any other options. Plan: Tylenol with codeine No. 21 every 4 hours as needed for pain.

## 2023-08-02 NOTE — TELEPHONE ENCOUNTER
Phoned Christal/[facility]. Tramadol is now out of stock. [facility] has Tylenol #3  #20 tabs in stock. Please advise.

## 2023-08-02 NOTE — TELEPHONE ENCOUNTER
Pt states that all the pharmacies in her area are out of tramadol. Wants to know if Dr can prescribe something else for 30 days.

## 2023-08-23 DIAGNOSIS — G89.4 CHRONIC PAIN SYNDROME: ICD-10-CM

## 2023-08-23 RX ORDER — ALPRAZOLAM 0.5 MG/1
0.5 TABLET ORAL 3 TIMES DAILY PRN
Qty: 90 TABLET | Refills: 0 | Status: SHIPPED | OUTPATIENT
Start: 2023-08-23

## 2023-08-23 NOTE — TELEPHONE ENCOUNTER
Last Refill: 7/24/2023 #90 with 0 refills  Last  Px: 10/10/2022  F/U Instructions: Return in 1 year (on 10/10/2023)     Future appt: Your appointments       Date & Time Appointment Department [facility])    Oct 10, 2023  3:00 PM CDT Medicare Annual Well Visit with MD Marsha De La Cruz, [facility] [place])              Marsha Matias, [facility] [place]  [address] [phone]  [phone]          Last Appointment with provider:   Visit date not found  [facility] appointment at [facility]:  Visit date not found  Cholesterol, Total (mg/dL)   Date Value   10/10/2022 188     HDL Cholesterol (mg/dL)   Date Value   10/10/2022 52     LDL Cholesterol (mg/dL)   Date Value   10/10/2022 115 (H)     Triglycerides (mg/dL)   Date Value   10/10/2022 117     No results found for: EAG, A1C  Lab Results   Component Value Date    TSH 2.080 10/10/2022       No follow-ups on file.

## 2023-08-24 DIAGNOSIS — J43.1 PANLOBULAR EMPHYSEMA (HCC): Primary | ICD-10-CM

## 2023-08-24 NOTE — TELEPHONE ENCOUNTER
Last Refill: 3/28/2023 #3 with 0 refills  Last Px: 10/10/2022  F/U Instructions: Return in 1 year (on 10/10/2023)     Future appt: Your appointments       Date & Time Appointment Department [facility])    Oct 10, 2023  3:00 PM CDT Medicare Annual Well Visit with Rekha Nolen MD [address] [place] [address])              [address] [place] [address] [place]  [facility] [address] [phone]  [phone]          Last Appointment with provider:   Visit date not found  Last appointment at [facility]:  Visit date not found  Cholesterol, Total (mg/dL)   Date Value   10/10/2022 188     HDL Cholesterol (mg/dL)   Date Value   10/10/2022 52     LDL Cholesterol (mg/dL)   Date Value   10/10/2022 115 (H)     Triglycerides (mg/dL)   Date Value   10/10/2022 117     No results found for: EAG, A1C  Lab Results   Component Value Date    TSH 2.080 10/10/2022       No follow-ups on file.

## 2023-08-25 RX ORDER — ALBUTEROL SULFATE 90 UG/1
2 AEROSOL, METERED RESPIRATORY (INHALATION) EVERY 4 HOURS PRN
Qty: 54 G | Refills: 3 | Status: SHIPPED | OUTPATIENT
Start: 2023-08-25

## 2023-08-30 DIAGNOSIS — G89.4 CHRONIC PAIN SYNDROME: ICD-10-CM

## 2023-08-30 RX ORDER — TRAMADOL HYDROCHLORIDE 50 MG/1
100 TABLET ORAL EVERY 6 HOURS PRN
Qty: 240 TABLET | Refills: 0 | Status: SHIPPED | OUTPATIENT
Start: 2023-08-30

## 2023-08-31 ENCOUNTER — TELEPHONE (OUTPATIENT)
Dept: FAMILY MEDICINE CLINIC | Facility: CLINIC | Age: 78
End: 2023-08-31

## 2023-09-01 RX ORDER — ACETAMINOPHEN AND CODEINE PHOSPHATE 300; 30 MG/1; MG/1
1 TABLET ORAL EVERY 4 HOURS PRN
Qty: 30 TABLET | Refills: 0 | Status: SHIPPED
Start: 2023-09-01

## 2023-09-05 DIAGNOSIS — G89.4 CHRONIC PAIN SYNDROME: ICD-10-CM

## 2023-09-05 RX ORDER — TRAMADOL HYDROCHLORIDE 50 MG/1
100 TABLET ORAL EVERY 6 HOURS PRN
Qty: 240 TABLET | Refills: 0 | Status: SHIPPED | OUTPATIENT
Start: 2023-09-05

## 2023-09-05 NOTE — TELEPHONE ENCOUNTER
Pt would like Dr to call [address] [place] [address] and see if they have tramadol available. If they do, pt would like refill to go there. Pt states that the tylenol with codeine made her sick and doesn't want to take it.

## 2023-09-05 NOTE — TELEPHONE ENCOUNTER
Patient states the Tylenol w/Codeine made her  Sick this time. Asking for the Tramadol to be filled at  [facility].

## 2023-09-21 DIAGNOSIS — G89.4 CHRONIC PAIN SYNDROME: ICD-10-CM

## 2023-09-21 NOTE — TELEPHONE ENCOUNTER
Future appt: Your appointments       Date & Time Appointment Department [facility])    Oct 10, 2023  3:00 PM CDT Medicare Annual Well Visit with Antonio Petersen MD [address] [place] [address], Kim Bass ([place])              [address] [place] [address] Group [place]  [address] [phone]  [phone]          Last Appointment with provider:   10/10/22(PX)-f/u 1yr(10/10/23)  Last appointment at [facility] [place]:  Visit date not found      ALPRAZolam 0.5 MG Oral Tab  90tab  0refill    Filled:8/23/23              Summary: Take 1 tablet (0.5 mg total) by mouth 3 (three) times daily as needed for Anxiety. Cholesterol, Total (mg/dL)   Date Value   10/10/2022 188     HDL Cholesterol (mg/dL)   Date Value   10/10/2022 52     LDL Cholesterol (mg/dL)   Date Value   10/10/2022 115 (H)     Triglycerides (mg/dL)   Date Value   10/10/2022 117     No results found for: \"EAG\", \"A1C\"  Lab Results   Component Value Date    TSH 2.080 10/10/2022       No follow-ups on file. \

## 2023-09-22 RX ORDER — ALPRAZOLAM 0.5 MG/1
0.5 TABLET ORAL 3 TIMES DAILY PRN
Qty: 90 TABLET | Refills: 0 | Status: SHIPPED | OUTPATIENT
Start: 2023-09-22

## 2025-05-04 NOTE — TELEPHONE ENCOUNTER
Tramadol: 9/1/21    Future appt:     Your appointments     Date & Time Appointment Department [facility])    Oct 04, 2021 11:00 AM CDT Medicare Annual Well Visit with Jonny Pickens MD [address] [place] [address], [facility] Myriam Lozano Male

## (undated) DIAGNOSIS — R11.0 NAUSEA: ICD-10-CM

## (undated) DIAGNOSIS — G89.4 CHRONIC PAIN SYNDROME: ICD-10-CM

## (undated) NOTE — MR AVS SNAPSHOT
[address] [place]  [address] [phone]               Thank you for choosing us for your health care visit with Bettie Espinoza MD.  We are glad to serve you and happy to provide you with this summary o Take 1 tablet (40 mg total) by mouth once daily. Commonly known as:  LOTENSIN           escitalopram 5 MG Tabs   Take 1 tablet (5 mg total) by mouth daily.    Commonly known as:  LEXAPRO           gabapentin 100 MG Caps   Take 1 capsule (100 mg total) by information, go to [email or web address] and click on the Sign Up Now link in the [employer] box. Enter your [employer] Activation Code exactly as it appears below along with your Zip Code and Date of Birth to complete the sign-up process.  If you do

## (undated) NOTE — MR AVS SNAPSHOT
[address] [place]  [phone]               Thank you for choosing us for your health care visit with Maurice Crespo MD.  We are glad to serve you and happy to provide you with this summary o taking this medication, and follow the directions you see here. Commonly known as:  LOTENSIN           D3 MAXIMUM STRENGTH 5000 units Caps   Generic drug:  cholecalciferol           gabapentin 100 MG Caps   Take 1 capsule (100 mg total) by mouth nightly. Expires: 4/2/2017  9:56 AM    If you have questions, you can call [phone] to talk to our [facility] Staff. Remember, [employer] is NOT to be used for urgent needs. For medical emergencies, dial 911.            Visit [employer] online a

## (undated) NOTE — MR AVS SNAPSHOT
[address] [place]  [address] [phone]               Thank you for choosing us for your health care visit with Dirk Guzmán MD.  We are glad to serve you and happy to provide you with this summary o Take 81 mg by mouth daily. Benazepril HCl 40 MG Tabs   Take 1 tablet (40 mg total) by mouth once daily. What changed:  See the new instructions.    Commonly known as:  LOTENSIN           gabapentin 100 MG Caps   Take 1 capsule (100 mg total) by visit,  view other health information, and more. To sign up or find more information, go to [email or web address] and click on the Sign Up Now link in the [employer] box.      Enter your [employer] Activation Code exactly as it appears below along with yo ? Cover porch steps with gritty weather proof paint. ? Pay attention to curbs and other changes in surfaces when out in the community. ? Take care when walking on gravel or grassy surfaces. ? Avoid walking on snowy or icy surfaces. ?  Use a cane or walk

## (undated) NOTE — Clinical Note
FYI, patient forgot to mention this to you, last office visit.   I have referred this patient to dermatologist in [place] per her request.